# Patient Record
Sex: FEMALE | Race: BLACK OR AFRICAN AMERICAN | NOT HISPANIC OR LATINO | Employment: OTHER | URBAN - METROPOLITAN AREA
[De-identification: names, ages, dates, MRNs, and addresses within clinical notes are randomized per-mention and may not be internally consistent; named-entity substitution may affect disease eponyms.]

---

## 2023-04-19 ENCOUNTER — OFFICE VISIT (OUTPATIENT)
Dept: PRIMARY CARE | Facility: CLINIC | Age: 63
End: 2023-04-19
Payer: COMMERCIAL

## 2023-04-19 VITALS
RESPIRATION RATE: 12 BRPM | HEART RATE: 67 BPM | BODY MASS INDEX: 40.4 KG/M2 | DIASTOLIC BLOOD PRESSURE: 82 MMHG | HEIGHT: 63 IN | SYSTOLIC BLOOD PRESSURE: 138 MMHG | WEIGHT: 228 LBS

## 2023-04-19 DIAGNOSIS — I10 HYPERTENSION, UNSPECIFIED TYPE: ICD-10-CM

## 2023-04-19 DIAGNOSIS — Z86.79 HX OF ANEURYSM: ICD-10-CM

## 2023-04-19 DIAGNOSIS — R73.9 HYPERGLYCEMIA: ICD-10-CM

## 2023-04-19 DIAGNOSIS — E78.5 HYPERLIPIDEMIA, UNSPECIFIED HYPERLIPIDEMIA TYPE: Primary | ICD-10-CM

## 2023-04-19 DIAGNOSIS — E66.3 OVERWEIGHT: ICD-10-CM

## 2023-04-19 DIAGNOSIS — Z00.00 ENCOUNTER FOR ANNUAL PHYSICAL EXAM: ICD-10-CM

## 2023-04-19 DIAGNOSIS — E03.9 HYPOTHYROIDISM, UNSPECIFIED TYPE: ICD-10-CM

## 2023-04-19 DIAGNOSIS — E55.9 VITAMIN D DEFICIENCY: ICD-10-CM

## 2023-04-19 DIAGNOSIS — K21.9 GASTROESOPHAGEAL REFLUX DISEASE, UNSPECIFIED WHETHER ESOPHAGITIS PRESENT: ICD-10-CM

## 2023-04-19 PROBLEM — M62.838 MUSCLE SPASM: Status: ACTIVE | Noted: 2023-04-19

## 2023-04-19 PROBLEM — E87.6 HYPOKALEMIA: Status: ACTIVE | Noted: 2023-04-19

## 2023-04-19 PROBLEM — J20.9 ACUTE BRONCHITIS: Status: ACTIVE | Noted: 2023-04-19

## 2023-04-19 PROBLEM — R32 URINARY INCONTINENCE: Status: ACTIVE | Noted: 2023-04-19

## 2023-04-19 PROBLEM — G47.30 SLEEP APNEA: Status: ACTIVE | Noted: 2023-04-19

## 2023-04-19 PROBLEM — M79.671 RIGHT FOOT PAIN: Status: ACTIVE | Noted: 2023-04-19

## 2023-04-19 PROBLEM — R20.0 RIGHT ARM NUMBNESS: Status: ACTIVE | Noted: 2023-04-19

## 2023-04-19 PROBLEM — G47.00 INSOMNIA: Status: ACTIVE | Noted: 2023-04-19

## 2023-04-19 PROBLEM — R13.10 DIFFICULTY SWALLOWING: Status: ACTIVE | Noted: 2023-04-19

## 2023-04-19 PROBLEM — R06.83 SNORING: Status: ACTIVE | Noted: 2023-04-19

## 2023-04-19 LAB
POC APPEARANCE, URINE: CLEAR
POC BILIRUBIN, URINE: NEGATIVE
POC BLOOD, URINE: NEGATIVE
POC COLOR, URINE: YELLOW
POC FINGERSTICK BLOOD GLUCOSE: 101 MG/DL (ref 70–100)
POC GLUCOSE, URINE: NEGATIVE MG/DL
POC KETONES, URINE: NEGATIVE MG/DL
POC LEUKOCYTES, URINE: NEGATIVE
POC NITRITE,URINE: NEGATIVE
POC PH, URINE: 5 PH
POC PROTEIN, URINE: NEGATIVE MG/DL
POC SPECIFIC GRAVITY, URINE: 1.02
POC UROBILINOGEN, URINE: 0.2 EU/DL

## 2023-04-19 PROCEDURE — 3075F SYST BP GE 130 - 139MM HG: CPT | Performed by: INTERNAL MEDICINE

## 2023-04-19 PROCEDURE — 99396 PREV VISIT EST AGE 40-64: CPT | Performed by: INTERNAL MEDICINE

## 2023-04-19 PROCEDURE — 1036F TOBACCO NON-USER: CPT | Performed by: INTERNAL MEDICINE

## 2023-04-19 PROCEDURE — 3079F DIAST BP 80-89 MM HG: CPT | Performed by: INTERNAL MEDICINE

## 2023-04-19 PROCEDURE — 93000 ELECTROCARDIOGRAM COMPLETE: CPT | Performed by: INTERNAL MEDICINE

## 2023-04-19 PROCEDURE — 82962 GLUCOSE BLOOD TEST: CPT | Performed by: INTERNAL MEDICINE

## 2023-04-19 PROCEDURE — 99214 OFFICE O/P EST MOD 30 MIN: CPT | Performed by: INTERNAL MEDICINE

## 2023-04-19 PROCEDURE — 81002 URINALYSIS NONAUTO W/O SCOPE: CPT | Performed by: INTERNAL MEDICINE

## 2023-04-19 PROCEDURE — 81003 URINALYSIS AUTO W/O SCOPE: CPT | Performed by: INTERNAL MEDICINE

## 2023-04-19 RX ORDER — LEVOTHYROXINE SODIUM 75 UG/1
75 TABLET ORAL DAILY
COMMUNITY
Start: 2021-04-14 | End: 2023-08-25

## 2023-04-19 RX ORDER — ATORVASTATIN CALCIUM 40 MG/1
40 TABLET, FILM COATED ORAL DAILY
COMMUNITY
Start: 2019-01-29 | End: 2024-03-04 | Stop reason: SDUPTHER

## 2023-04-19 RX ORDER — MAGNESIUM HYDROXIDE 400 MG/5ML
1 SUSPENSION, ORAL (FINAL DOSE FORM) ORAL DAILY
COMMUNITY
Start: 2019-01-29

## 2023-04-19 RX ORDER — ALBUTEROL SULFATE 90 UG/1
2 AEROSOL, METERED RESPIRATORY (INHALATION) EVERY 6 HOURS PRN
COMMUNITY
Start: 2022-12-27 | End: 2023-04-19 | Stop reason: ALTCHOICE

## 2023-04-19 RX ORDER — INHALER, ASSIST DEVICES
SPACER (EA) MISCELLANEOUS
COMMUNITY
Start: 2022-12-27 | End: 2024-03-04 | Stop reason: ALTCHOICE

## 2023-04-19 RX ORDER — TIZANIDINE HYDROCHLORIDE 4 MG/1
4 CAPSULE, GELATIN COATED ORAL 2 TIMES DAILY
COMMUNITY
Start: 2022-07-29 | End: 2023-04-19 | Stop reason: ALTCHOICE

## 2023-04-19 RX ORDER — CETIRIZINE HYDROCHLORIDE 10 MG/1
10 TABLET ORAL DAILY
COMMUNITY
Start: 2022-12-27 | End: 2023-04-19 | Stop reason: ALTCHOICE

## 2023-04-19 RX ORDER — POTASSIUM CHLORIDE 750 MG/1
10 TABLET, EXTENDED RELEASE ORAL DAILY
COMMUNITY
End: 2023-11-20 | Stop reason: SDUPTHER

## 2023-04-19 RX ORDER — BENZONATATE 200 MG/1
200 CAPSULE ORAL EVERY 8 HOURS
COMMUNITY
Start: 2022-12-27 | End: 2023-04-19 | Stop reason: ALTCHOICE

## 2023-04-19 RX ORDER — TRIAMTERENE/HYDROCHLOROTHIAZID 37.5-25 MG
1 TABLET ORAL DAILY
COMMUNITY
End: 2024-03-25 | Stop reason: SDUPTHER

## 2023-04-19 RX ORDER — EMPAGLIFLOZIN 10 MG/1
TABLET, FILM COATED ORAL DAILY
COMMUNITY
End: 2023-11-20 | Stop reason: ALTCHOICE

## 2023-04-19 RX ORDER — OMEPRAZOLE 20 MG/1
20 CAPSULE, DELAYED RELEASE ORAL 2 TIMES DAILY
COMMUNITY

## 2023-04-19 RX ORDER — CHOLECALCIFEROL (VITAMIN D3) 50 MCG
50 TABLET ORAL DAILY
COMMUNITY
Start: 2021-08-10 | End: 2024-03-04 | Stop reason: SDUPTHER

## 2023-04-19 ASSESSMENT — PATIENT HEALTH QUESTIONNAIRE - PHQ9
SUM OF ALL RESPONSES TO PHQ9 QUESTIONS 1 AND 2: 0
1. LITTLE INTEREST OR PLEASURE IN DOING THINGS: NOT AT ALL
2. FEELING DOWN, DEPRESSED OR HOPELESS: NOT AT ALL

## 2023-04-19 ASSESSMENT — ENCOUNTER SYMPTOMS
LOSS OF SENSATION IN FEET: 0
DEPRESSION: 0
OCCASIONAL FEELINGS OF UNSTEADINESS: 0

## 2023-04-19 NOTE — ASSESSMENT & PLAN NOTE
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

## 2023-04-19 NOTE — PROGRESS NOTES
"Subjective :  Chief Complaint: Cici Bains is an 62 y.o. female here for an annual wellness visit and general medical care and f/u.     HPI:  HPI    Review of Systems  All systems reviewed and negative except for what was mentioned in the HPI    Objective   /82   Pulse 67   Resp 12   Ht 1.6 m (5' 3\")   Wt 103 kg (228 lb)   BMI 40.39 kg/m²     Physical Exam    Imaging:  No results found.     Labs reviewed:    Lab Results   Component Value Date    WBC 5.5 03/13/2020    HGB 12.2 03/13/2020    HCT 39.3 03/13/2020     03/13/2020    CHOL 152 03/13/2020    TRIG 54 03/13/2020    HDL 61.0 03/13/2020    ALT 20 03/13/2020    AST 22 03/13/2020     03/13/2020    K 3.2 (L) 03/13/2020     03/13/2020    CREATININE 0.86 03/13/2020    BUN 17 03/13/2020    CO2 29 03/13/2020    TSH 5.67 (H) 03/13/2020    INR 1.0 02/24/2020    HGBA1C 6.2 08/21/2019       Past Medical, Surgical, and Family History reviewed and updated in chart.    I have reviewed and reconciled the medication list with the patient today.   Current Outpatient Medications:     albuterol 90 mcg/actuation inhaler, Inhale 2 puffs every 6 hours if needed., Disp: , Rfl:     atorvastatin (Lipitor) 40 mg tablet, Take 1 tablet (40 mg) by mouth once daily., Disp: , Rfl:     benzonatate (Tessalon) 200 mg capsule, Take 1 capsule (200 mg) by mouth in the morning and 1 capsule (200 mg) at noon and 1 capsule (200 mg) before bedtime., Disp: , Rfl:     cetirizine (ZyrTEC) 10 mg tablet, Take 1 tablet (10 mg) by mouth once daily., Disp: , Rfl:     cholecalciferol (Vitamin D-3) 50 MCG (2000 UT) tablet, Take 1 tablet (50 mcg) by mouth once daily., Disp: , Rfl:     cyanocobalamin, vitamin B-12, 5,000 mcg tablet,disintegrating, Take 1 tablet by mouth once daily., Disp: , Rfl:     levothyroxine (Synthroid, Levoxyl) 75 mcg tablet, Take 1 tablet (75 mcg) by mouth once daily., Disp: , Rfl:     OptiChamber Suzanna VHC inhaler, use as directed, Disp: , Rfl:     " tiZANidine (Zanaflex) 4 mg capsule, Take 1 capsule (4 mg) by mouth in the morning and 1 capsule (4 mg) before bedtime., Disp: , Rfl:     Jardiance 10 mg, Take by mouth once daily., Disp: , Rfl:     Klor-Con M10 10 mEq ER tablet, Take 1 tablet (10 mEq) by mouth once daily., Disp: , Rfl:     omeprazole (PriLOSEC) 20 mg DR capsule, Take 1 capsule (20 mg) by mouth in the morning and 1 capsule (20 mg) before bedtime., Disp: , Rfl:     triamterene-hydrochlorothiazid (Maxzide-25) 37.5-25 mg tablet, Take 1 tablet by mouth once daily., Disp: , Rfl:      List of current healthcare providers:  Patient Care Team:  Yohan Cornelius MD as PCP - General     HRA:     Over the past 2 weeks, how often have you been bothered by any of the following problems?  Little interest or pleasure in doing things: Not at all  Feeling down, depressed, or hopeless: Not at all  Patient Health Questionnaire-2 Score: 0                         Assessment/Plan :  Problem List Items Addressed This Visit    None    The following health maintenance schedule was reviewed with the patient and provided in printed form in the after visit summary:  Health Maintenance   Topic Date Due    Yearly Adult Physical  Never done    Medicare Initial Physical (IPPE)  Never done    HIV Screening  Never done    MMR Vaccines (1 of 1 - Standard series) Never done    Hepatitis C Screening  Never done    Cervical Cancer Screening  Never done    Zoster Vaccines (1 of 2) Never done    DTaP/Tdap/Td Vaccines (3 - Td or Tdap) 04/01/2019    COVID-19 Vaccine (3 - Booster for Pfizer series) 06/03/2021    Pneumococcal Vaccine: 65+ Years (1 - PCV) 07/16/2023    Influenza Vaccine (Season Ended) 09/01/2023    Mammogram  02/17/2024    Lipid Panel  03/13/2025    Colorectal Cancer Screening  03/06/2030    Hepatitis B Vaccines  Completed    HIB Vaccines  Aged Out    IPV Vaccines  Aged Out    Hepatitis A Vaccines  Aged Out    Meningococcal Vaccine  Aged Out    Rotavirus Vaccines  Aged Out     HPV Vaccines  Aged Out    Pneumococcal Vaccine: Pediatrics (0 to 5 Years) and At-Risk Patients (6 to 64 Years)  Aged Out    Irritable Bowel Syndrome  Discontinued       Advance Care Planning   No living well           No orders of the defined types were placed in this encounter.      Continue current medications as listed  Follow up in 3 months we will check hemoglobin A1c CMP and a CBC, thyroid profile, lipid profile

## 2023-04-19 NOTE — ASSESSMENT & PLAN NOTE
Continue PPI  Patient is concerned about the GERD not getting better  Also has history of polyps in the stomach she would like to have an evaluation.

## 2023-04-19 NOTE — ASSESSMENT & PLAN NOTE
TSH is slightly elevated I will continue Synthroid the same dose and continue to monitor TSH T3-T4

## 2023-04-24 DIAGNOSIS — R73.9 HYPERGLYCEMIA: ICD-10-CM

## 2023-04-24 DIAGNOSIS — E03.9 HYPOTHYROID: ICD-10-CM

## 2023-04-24 DIAGNOSIS — I10 HTN (HYPERTENSION): ICD-10-CM

## 2023-04-24 DIAGNOSIS — E78.5 HLD (HYPERLIPIDEMIA): Primary | ICD-10-CM

## 2023-04-24 DIAGNOSIS — R93.5 ABNORMAL CT SCAN, PELVIS: ICD-10-CM

## 2023-07-19 ENCOUNTER — OFFICE VISIT (OUTPATIENT)
Dept: PRIMARY CARE | Facility: CLINIC | Age: 63
End: 2023-07-19
Payer: MEDICARE

## 2023-07-19 VITALS
OXYGEN SATURATION: 97 % | DIASTOLIC BLOOD PRESSURE: 72 MMHG | BODY MASS INDEX: 40.74 KG/M2 | WEIGHT: 230 LBS | SYSTOLIC BLOOD PRESSURE: 126 MMHG | RESPIRATION RATE: 12 BRPM | HEART RATE: 65 BPM

## 2023-07-19 DIAGNOSIS — E27.8 ADRENAL MASS (MULTI): ICD-10-CM

## 2023-07-19 DIAGNOSIS — I10 HYPERTENSION, UNSPECIFIED TYPE: ICD-10-CM

## 2023-07-19 DIAGNOSIS — R73.9 HYPERGLYCEMIA: ICD-10-CM

## 2023-07-19 DIAGNOSIS — I10 ESSENTIAL HYPERTENSION: Primary | Chronic | ICD-10-CM

## 2023-07-19 DIAGNOSIS — E78.5 HYPERLIPIDEMIA, UNSPECIFIED HYPERLIPIDEMIA TYPE: ICD-10-CM

## 2023-07-19 DIAGNOSIS — E03.9 HYPOTHYROIDISM, UNSPECIFIED TYPE: ICD-10-CM

## 2023-07-19 PROBLEM — M54.2 NECK PAIN: Status: ACTIVE | Noted: 2018-04-26

## 2023-07-19 PROBLEM — K21.9 GASTROESOPHAGEAL REFLUX DISEASE: Status: ACTIVE | Noted: 2017-01-13

## 2023-07-19 PROBLEM — M79.2 RADICULAR PAIN OF LEFT UPPER EXTREMITY: Status: ACTIVE | Noted: 2018-04-26

## 2023-07-19 LAB — POC FINGERSTICK BLOOD GLUCOSE: 114 MG/DL (ref 70–100)

## 2023-07-19 PROCEDURE — 99214 OFFICE O/P EST MOD 30 MIN: CPT | Performed by: INTERNAL MEDICINE

## 2023-07-19 PROCEDURE — 82962 GLUCOSE BLOOD TEST: CPT | Performed by: INTERNAL MEDICINE

## 2023-07-19 PROCEDURE — 3074F SYST BP LT 130 MM HG: CPT | Performed by: INTERNAL MEDICINE

## 2023-07-19 PROCEDURE — 1159F MED LIST DOCD IN RCRD: CPT | Performed by: INTERNAL MEDICINE

## 2023-07-19 PROCEDURE — 3078F DIAST BP <80 MM HG: CPT | Performed by: INTERNAL MEDICINE

## 2023-07-19 PROCEDURE — 1036F TOBACCO NON-USER: CPT | Performed by: INTERNAL MEDICINE

## 2023-07-19 RX ORDER — TRAZODONE HYDROCHLORIDE 50 MG/1
50 TABLET ORAL NIGHTLY PRN
COMMUNITY
Start: 2014-04-09

## 2023-07-19 NOTE — PROGRESS NOTES
Subjective   Chief complaint: Cici Bains is a 63 y.o. female who presents for Elbow Injury (Pt c/o right elbow pain with stiffness. Patient is new on medicare ).    HPI:  Pt is here for blood work follow up. She has been doing well today, but has mild concern about her right elbow pain. She states this pain began a week ago in her elbow and she believes it was exacerbate by cooking for her birthday.        Objective   /72   Pulse 65   Resp 12   Wt 104 kg (230 lb)   SpO2 97%   BMI 40.74 kg/m²   Physical Exam  Constitutional:       Appearance: Normal appearance. She is obese.   HENT:      Head: Normocephalic and atraumatic.      Right Ear: External ear normal.      Left Ear: External ear normal.      Nose: Nose normal.      Mouth/Throat:      Pharynx: Oropharynx is clear.   Eyes:      Extraocular Movements: Extraocular movements intact.      Conjunctiva/sclera: Conjunctivae normal.      Pupils: Pupils are equal, round, and reactive to light.   Cardiovascular:      Rate and Rhythm: Normal rate and regular rhythm.      Heart sounds: No murmur heard.  Pulmonary:      Effort: Pulmonary effort is normal. No respiratory distress.      Breath sounds: Normal breath sounds.   Abdominal:      General: Abdomen is flat. Bowel sounds are normal.      Palpations: Abdomen is soft.   Musculoskeletal:         General: Normal range of motion.      Cervical back: Normal range of motion. No rigidity or tenderness.   Skin:     General: Skin is warm and dry.   Neurological:      General: No focal deficit present.      Mental Status: She is alert. Mental status is at baseline.   Psychiatric:         Mood and Affect: Mood normal.         Behavior: Behavior normal.       I have reviewed and reconciled the medication list with the patient today.   Current Outpatient Medications:     atorvastatin (Lipitor) 40 mg tablet, Take 1 tablet (40 mg) by mouth once daily., Disp: , Rfl:     cholecalciferol (Vitamin D-3) 50 MCG (2000 UT)  tablet, Take 1 tablet (50 mcg) by mouth once daily., Disp: , Rfl:     cyanocobalamin, vitamin B-12, 5,000 mcg tablet,disintegrating, Take 1 tablet by mouth once daily., Disp: , Rfl:     Jardiance 10 mg, Take by mouth once daily., Disp: , Rfl:     Klor-Con M10 10 mEq ER tablet, Take 1 tablet (10 mEq) by mouth once daily., Disp: , Rfl:     levothyroxine (Synthroid, Levoxyl) 75 mcg tablet, Take 1 tablet (75 mcg) by mouth once daily., Disp: , Rfl:     omeprazole (PriLOSEC) 20 mg DR capsule, Take 1 capsule (20 mg) by mouth 2 times a day., Disp: , Rfl:     OptiChamber Suzanna VHC inhaler, use as directed, Disp: , Rfl:     traZODone (Desyrel) 50 mg tablet, Take 1 tablet (50 mg) by mouth as needed at bedtime for sleep., Disp: , Rfl:     triamterene-hydrochlorothiazid (Maxzide-25) 37.5-25 mg tablet, Take 1 tablet by mouth once daily., Disp: , Rfl:      Imaging:  No results found.     Labs reviewed:    Lab Results   Component Value Date    WBC 5.5 03/13/2020    HGB 12.2 03/13/2020    HCT 39.3 03/13/2020     03/13/2020    CHOL 152 03/13/2020    TRIG 54 03/13/2020    HDL 61.0 03/13/2020    ALT 20 03/13/2020    AST 22 03/13/2020     03/13/2020    K 3.2 (L) 03/13/2020     03/13/2020    CREATININE 0.86 03/13/2020    BUN 17 03/13/2020    CO2 29 03/13/2020    TSH 5.67 (H) 03/13/2020    INR 1.0 02/24/2020    HGBA1C 6.2 08/21/2019       Assessment/Plan   Problem List Items Addressed This Visit          Cardiac and Vasculature    Essential hypertension - Primary (Chronic)     Well controlled. Continue medication as prescribed.          HLD (hyperlipidemia)     Lipid panel WNL today.             Endocrine/Metabolic    Hyperglycemia    Relevant Orders    POCT fingerstick glucose manually resulted (Completed)    Hypothyroid     TSH 4.79 today.          Reviewed CT abd/pelvis with patient and discussed findings of nodule on the adrenal gland, likely an adrenal adenoma. Will repeat CT adrenals for further evaluation.    HbA1c remains stable at 6.3. Discussed healthy lifestyle. TSH mildly elevated, no concern today.  Continue current medications as listed.  Follow up in November for repeat HbA1c.

## 2023-08-25 DIAGNOSIS — E03.9 HYPOTHYROIDISM, UNSPECIFIED: ICD-10-CM

## 2023-08-25 RX ORDER — LEVOTHYROXINE SODIUM 75 UG/1
75 TABLET ORAL DAILY
Qty: 90 TABLET | Refills: 3 | Status: SHIPPED | OUTPATIENT
Start: 2023-08-25 | End: 2023-11-09 | Stop reason: SDUPTHER

## 2023-10-03 ENCOUNTER — TELEPHONE (OUTPATIENT)
Dept: PRIMARY CARE | Facility: CLINIC | Age: 63
End: 2023-10-03
Payer: MEDICARE

## 2023-10-03 DIAGNOSIS — I10 HTN (HYPERTENSION): Primary | ICD-10-CM

## 2023-10-03 DIAGNOSIS — R73.9 HYPERGLYCEMIA: ICD-10-CM

## 2023-10-13 ENCOUNTER — TELEPHONE (OUTPATIENT)
Dept: PRIMARY CARE | Facility: CLINIC | Age: 63
End: 2023-10-13
Payer: MEDICARE

## 2023-10-17 ENCOUNTER — LAB (OUTPATIENT)
Dept: LAB | Facility: LAB | Age: 63
End: 2023-10-17
Payer: MEDICARE

## 2023-10-17 DIAGNOSIS — E27.8 ADRENAL MASS (MULTI): ICD-10-CM

## 2023-10-17 LAB
CREAT SERPL-MCNC: 0.93 MG/DL (ref 0.5–1.05)
GFR SERPL CREATININE-BSD FRML MDRD: 68 ML/MIN/1.73M*2

## 2023-10-17 PROCEDURE — 36415 COLL VENOUS BLD VENIPUNCTURE: CPT

## 2023-10-17 PROCEDURE — 82565 ASSAY OF CREATININE: CPT

## 2023-10-18 ENCOUNTER — ANCILLARY PROCEDURE (OUTPATIENT)
Dept: RADIOLOGY | Facility: CLINIC | Age: 63
End: 2023-10-18
Payer: MEDICARE

## 2023-10-18 DIAGNOSIS — E03.9 HYPOTHYROIDISM, UNSPECIFIED TYPE: ICD-10-CM

## 2023-10-18 DIAGNOSIS — I10 ESSENTIAL HYPERTENSION: Chronic | ICD-10-CM

## 2023-10-18 PROCEDURE — 74150 CT ABDOMEN W/O CONTRAST: CPT | Performed by: RADIOLOGY

## 2023-10-18 PROCEDURE — 74150 CT ABDOMEN W/O CONTRAST: CPT

## 2023-10-30 PROBLEM — J20.9 ACUTE BRONCHITIS: Status: RESOLVED | Noted: 2023-04-19 | Resolved: 2023-10-30

## 2023-11-09 DIAGNOSIS — E03.9 HYPOTHYROIDISM, UNSPECIFIED: ICD-10-CM

## 2023-11-10 RX ORDER — LEVOTHYROXINE SODIUM 75 UG/1
75 TABLET ORAL DAILY
Qty: 90 TABLET | Refills: 3 | Status: SHIPPED | OUTPATIENT
Start: 2023-11-10 | End: 2024-03-25 | Stop reason: SDUPTHER

## 2023-11-13 ENCOUNTER — APPOINTMENT (OUTPATIENT)
Dept: PRIMARY CARE | Facility: CLINIC | Age: 63
End: 2023-11-13
Payer: MEDICARE

## 2023-11-20 ENCOUNTER — OFFICE VISIT (OUTPATIENT)
Dept: PRIMARY CARE | Facility: CLINIC | Age: 63
End: 2023-11-20
Payer: MEDICARE

## 2023-11-20 VITALS
BODY MASS INDEX: 40.92 KG/M2 | HEART RATE: 57 BPM | WEIGHT: 231 LBS | OXYGEN SATURATION: 95 % | SYSTOLIC BLOOD PRESSURE: 99 MMHG | RESPIRATION RATE: 12 BRPM | DIASTOLIC BLOOD PRESSURE: 63 MMHG

## 2023-11-20 DIAGNOSIS — E87.6 HYPOKALEMIA: ICD-10-CM

## 2023-11-20 DIAGNOSIS — I10 HYPERTENSION, UNSPECIFIED TYPE: Primary | ICD-10-CM

## 2023-11-20 DIAGNOSIS — R73.9 HYPERGLYCEMIA: ICD-10-CM

## 2023-11-20 DIAGNOSIS — Z12.31 ENCOUNTER FOR SCREENING MAMMOGRAM FOR BREAST CANCER: ICD-10-CM

## 2023-11-20 DIAGNOSIS — D35.02 ADENOMA OF LEFT ADRENAL GLAND: ICD-10-CM

## 2023-11-20 PROCEDURE — 3078F DIAST BP <80 MM HG: CPT | Performed by: INTERNAL MEDICINE

## 2023-11-20 PROCEDURE — 3074F SYST BP LT 130 MM HG: CPT | Performed by: INTERNAL MEDICINE

## 2023-11-20 PROCEDURE — 1159F MED LIST DOCD IN RCRD: CPT | Performed by: INTERNAL MEDICINE

## 2023-11-20 PROCEDURE — 1036F TOBACCO NON-USER: CPT | Performed by: INTERNAL MEDICINE

## 2023-11-20 PROCEDURE — 99214 OFFICE O/P EST MOD 30 MIN: CPT | Performed by: INTERNAL MEDICINE

## 2023-11-20 RX ORDER — POTASSIUM CHLORIDE 750 MG/1
10 TABLET, FILM COATED, EXTENDED RELEASE ORAL DAILY
Qty: 90 TABLET | Refills: 3 | Status: SHIPPED | OUTPATIENT
Start: 2023-11-20

## 2023-11-20 NOTE — PROGRESS NOTES
Subjective   Chief complaint: Cici Bains is a 63 y.o. female who presents for Follow-up (Pt is here for blood work follow up ).    HPI:  63 year old female presents today for blood work follow up. Vitals WNL. Hypotensive on exam today. Pt takes Maxzide for HTN but denies syncope, light headedness, vision changes. Blood work reviewed showing A1C of 6.3, CMP WNL. Pt also has a CT to review. CT reviewed showing left adrenal adenoma but otherwise WNL. No other health concerns or questions today.        Objective   BP 99/63   Pulse 57   Resp 12   Wt 105 kg (231 lb)   SpO2 95%   BMI 40.92 kg/m²   Physical Exam  Constitutional:       Appearance: Normal appearance. She is normal weight.   HENT:      Head: Normocephalic and atraumatic.   Eyes:      Extraocular Movements: Extraocular movements intact.      Conjunctiva/sclera: Conjunctivae normal.   Neurological:      General: No focal deficit present.      Mental Status: She is alert and oriented to person, place, and time. Mental status is at baseline.         I have reviewed and reconciled the medication list with the patient today.   Current Outpatient Medications:     atorvastatin (Lipitor) 40 mg tablet, Take 1 tablet (40 mg) by mouth once daily., Disp: , Rfl:     cholecalciferol (Vitamin D-3) 50 MCG (2000 UT) tablet, Take 1 tablet (50 mcg) by mouth once daily., Disp: , Rfl:     Jardiance 10 mg, Take by mouth once daily., Disp: , Rfl:     Klor-Con M10 10 mEq ER tablet, Take 1 tablet (10 mEq) by mouth once daily., Disp: , Rfl:     levothyroxine (Synthroid, Levoxyl) 75 mcg tablet, Take 1 tablet (75 mcg) by mouth once daily., Disp: 90 tablet, Rfl: 3    omeprazole (PriLOSEC) 20 mg DR capsule, Take 1 capsule (20 mg) by mouth 2 times a day., Disp: , Rfl:     OptiChamber Suzanna VHC inhaler, use as directed, Disp: , Rfl:     traZODone (Desyrel) 50 mg tablet, Take 1 tablet (50 mg) by mouth as needed at bedtime for sleep., Disp: , Rfl:     triamterene-hydrochlorothiazid  (Maxzide-25) 37.5-25 mg tablet, Take 1 tablet by mouth once daily., Disp: , Rfl:     cyanocobalamin, vitamin B-12, 5,000 mcg tablet,disintegrating, Take 1 tablet by mouth once daily., Disp: , Rfl:      Imaging:  No results found.     Labs reviewed:    Lab Results   Component Value Date    WBC 5.5 03/13/2020    HGB 12.2 03/13/2020    HCT 39.3 03/13/2020     03/13/2020    CHOL 152 03/13/2020    TRIG 54 03/13/2020    HDL 61.0 03/13/2020    ALT 20 03/13/2020    AST 22 03/13/2020     03/13/2020    K 3.2 (L) 03/13/2020     03/13/2020    CREATININE 0.93 10/17/2023    BUN 17 03/13/2020    CO2 29 03/13/2020    TSH 5.67 (H) 03/13/2020    INR 1.0 02/24/2020    HGBA1C 6.2 08/21/2019       Assessment/Plan   Problem List Items Addressed This Visit       HTN (hypertension) - Primary     Hypotensive on exam today. Start taking half of current Maxzide tablet         Hyperglycemia     A1C 6.3. Increase Jardiance to 25mg         Hypokalemia     Potassium normal on CMP         Adenoma of left adrenal gland     Pt instructed that adenoma is benign             Continue current medications as listed  Follow up in 3 months for annual

## 2024-02-19 ENCOUNTER — HOSPITAL ENCOUNTER (OUTPATIENT)
Dept: RADIOLOGY | Facility: CLINIC | Age: 64
Discharge: HOME | End: 2024-02-19
Payer: MEDICARE

## 2024-02-19 DIAGNOSIS — Z12.31 ENCOUNTER FOR SCREENING MAMMOGRAM FOR BREAST CANCER: ICD-10-CM

## 2024-02-19 PROCEDURE — 77067 SCR MAMMO BI INCL CAD: CPT | Performed by: RADIOLOGY

## 2024-02-19 PROCEDURE — 77067 SCR MAMMO BI INCL CAD: CPT

## 2024-02-19 PROCEDURE — 77063 BREAST TOMOSYNTHESIS BI: CPT | Performed by: RADIOLOGY

## 2024-02-26 ENCOUNTER — CLINICAL SUPPORT (OUTPATIENT)
Dept: PRIMARY CARE | Facility: CLINIC | Age: 64
End: 2024-02-26
Payer: MEDICARE

## 2024-02-26 DIAGNOSIS — Z00.00 ENCOUNTER FOR ANNUAL WELLNESS VISIT (AWV) IN MEDICARE PATIENT: ICD-10-CM

## 2024-02-26 DIAGNOSIS — E87.6 HYPOKALEMIA: ICD-10-CM

## 2024-02-26 DIAGNOSIS — R73.9 HYPERGLYCEMIA: ICD-10-CM

## 2024-02-26 DIAGNOSIS — E03.9 HYPOTHYROIDISM, UNSPECIFIED TYPE: ICD-10-CM

## 2024-02-26 DIAGNOSIS — E55.9 VITAMIN D DEFICIENCY: ICD-10-CM

## 2024-02-26 DIAGNOSIS — I10 PRIMARY HYPERTENSION: ICD-10-CM

## 2024-02-26 DIAGNOSIS — E78.5 HYPERLIPIDEMIA, UNSPECIFIED HYPERLIPIDEMIA TYPE: ICD-10-CM

## 2024-02-26 DIAGNOSIS — D50.9 IRON DEFICIENCY ANEMIA, UNSPECIFIED IRON DEFICIENCY ANEMIA TYPE: ICD-10-CM

## 2024-02-26 DIAGNOSIS — I10 BENIGN ESSENTIAL HYPERTENSION: ICD-10-CM

## 2024-02-26 DIAGNOSIS — E78.00 ELEVATED LDL CHOLESTEROL LEVEL: ICD-10-CM

## 2024-02-26 PROBLEM — E27.8 ADRENAL MASS (MULTI): Status: ACTIVE | Noted: 2023-10-17

## 2024-02-26 PROBLEM — T14.8XXA INJURY OF MUSCULOSKELETAL SYSTEM: Status: ACTIVE | Noted: 2024-02-26

## 2024-02-26 LAB
25(OH)D3 SERPL-MCNC: 29 NG/ML (ref 30–100)
ALBUMIN SERPL BCP-MCNC: 3.9 G/DL (ref 3.4–5)
ALP SERPL-CCNC: 55 U/L (ref 33–136)
ALT SERPL W P-5'-P-CCNC: 25 U/L (ref 7–45)
ANION GAP SERPL CALC-SCNC: 13 MMOL/L (ref 10–20)
AST SERPL W P-5'-P-CCNC: 20 U/L (ref 9–39)
BILIRUB SERPL-MCNC: 0.6 MG/DL (ref 0–1.2)
BUN SERPL-MCNC: 15 MG/DL (ref 6–23)
CALCIUM SERPL-MCNC: 9.7 MG/DL (ref 8.6–10.6)
CHLORIDE SERPL-SCNC: 104 MMOL/L (ref 98–107)
CHOLEST SERPL-MCNC: 209 MG/DL (ref 0–199)
CHOLESTEROL/HDL RATIO: 3.7
CO2 SERPL-SCNC: 28 MMOL/L (ref 21–32)
CREAT SERPL-MCNC: 0.82 MG/DL (ref 0.5–1.05)
EGFRCR SERPLBLD CKD-EPI 2021: 79 ML/MIN/1.73M*2
ERYTHROCYTE [DISTWIDTH] IN BLOOD BY AUTOMATED COUNT: 13.6 % (ref 11.5–14.5)
EST. AVERAGE GLUCOSE BLD GHB EST-MCNC: 140 MG/DL
GLUCOSE SERPL-MCNC: 113 MG/DL (ref 74–99)
HBA1C MFR BLD: 6.5 %
HCT VFR BLD AUTO: 42.8 % (ref 36–46)
HDLC SERPL-MCNC: 56.3 MG/DL
HGB BLD-MCNC: 12.6 G/DL (ref 12–16)
LDLC SERPL CALC-MCNC: 135 MG/DL
MCH RBC QN AUTO: 29 PG (ref 26–34)
MCHC RBC AUTO-ENTMCNC: 29.4 G/DL (ref 32–36)
MCV RBC AUTO: 98 FL (ref 80–100)
NON HDL CHOLESTEROL: 153 MG/DL (ref 0–149)
NRBC BLD-RTO: 0 /100 WBCS (ref 0–0)
PLATELET # BLD AUTO: 199 X10*3/UL (ref 150–450)
POTASSIUM SERPL-SCNC: 3.8 MMOL/L (ref 3.5–5.3)
PROT SERPL-MCNC: 6.5 G/DL (ref 6.4–8.2)
RBC # BLD AUTO: 4.35 X10*6/UL (ref 4–5.2)
SODIUM SERPL-SCNC: 141 MMOL/L (ref 136–145)
T3FREE SERPL-MCNC: 3.2 PG/ML (ref 2.3–4.2)
T4 FREE SERPL-MCNC: 1.39 NG/DL (ref 0.78–1.48)
TRIGL SERPL-MCNC: 91 MG/DL (ref 0–149)
TSH SERPL-ACNC: 2.15 MIU/L (ref 0.44–3.98)
VLDL: 18 MG/DL (ref 0–40)
WBC # BLD AUTO: 5.2 X10*3/UL (ref 4.4–11.3)

## 2024-02-26 PROCEDURE — 85027 COMPLETE CBC AUTOMATED: CPT

## 2024-02-26 PROCEDURE — 84443 ASSAY THYROID STIM HORMONE: CPT

## 2024-02-26 PROCEDURE — 84439 ASSAY OF FREE THYROXINE: CPT

## 2024-02-26 PROCEDURE — 80053 COMPREHEN METABOLIC PANEL: CPT

## 2024-02-26 PROCEDURE — 84481 FREE ASSAY (FT-3): CPT

## 2024-02-26 PROCEDURE — 36415 COLL VENOUS BLD VENIPUNCTURE: CPT

## 2024-02-26 PROCEDURE — 83036 HEMOGLOBIN GLYCOSYLATED A1C: CPT

## 2024-02-26 PROCEDURE — 80061 LIPID PANEL: CPT

## 2024-02-26 PROCEDURE — 82306 VITAMIN D 25 HYDROXY: CPT

## 2024-03-01 ENCOUNTER — APPOINTMENT (OUTPATIENT)
Dept: PRIMARY CARE | Facility: CLINIC | Age: 64
End: 2024-03-01
Payer: MEDICARE

## 2024-03-04 ENCOUNTER — OFFICE VISIT (OUTPATIENT)
Dept: PRIMARY CARE | Facility: CLINIC | Age: 64
End: 2024-03-04
Payer: MEDICARE

## 2024-03-04 VITALS
SYSTOLIC BLOOD PRESSURE: 139 MMHG | HEART RATE: 80 BPM | BODY MASS INDEX: 40.74 KG/M2 | WEIGHT: 230 LBS | OXYGEN SATURATION: 96 % | DIASTOLIC BLOOD PRESSURE: 81 MMHG | RESPIRATION RATE: 14 BRPM

## 2024-03-04 DIAGNOSIS — Z00.00 ENCOUNTER FOR ANNUAL PHYSICAL EXAM: Primary | ICD-10-CM

## 2024-03-04 DIAGNOSIS — M81.0 OSTEOPOROSIS: ICD-10-CM

## 2024-03-04 DIAGNOSIS — R06.83 SNORING: ICD-10-CM

## 2024-03-04 DIAGNOSIS — E11.9 TYPE 2 DIABETES MELLITUS WITHOUT COMPLICATION, WITHOUT LONG-TERM CURRENT USE OF INSULIN (MULTI): ICD-10-CM

## 2024-03-04 DIAGNOSIS — Z00.00 ANNUAL PHYSICAL EXAM: ICD-10-CM

## 2024-03-04 DIAGNOSIS — E55.9 VITAMIN D DEFICIENCY: ICD-10-CM

## 2024-03-04 DIAGNOSIS — E78.5 HYPERLIPIDEMIA, UNSPECIFIED HYPERLIPIDEMIA TYPE: ICD-10-CM

## 2024-03-04 DIAGNOSIS — M19.90 OSTEOARTHRITIS, UNSPECIFIED OSTEOARTHRITIS TYPE, UNSPECIFIED SITE: ICD-10-CM

## 2024-03-04 DIAGNOSIS — R73.9 HYPERGLYCEMIA: ICD-10-CM

## 2024-03-04 LAB
POC APPEARANCE, URINE: CLEAR
POC BILIRUBIN, URINE: NEGATIVE
POC BLOOD, URINE: NEGATIVE
POC COLOR, URINE: YELLOW
POC GLUCOSE, URINE: NEGATIVE MG/DL
POC KETONES, URINE: NEGATIVE MG/DL
POC LEUKOCYTES, URINE: NEGATIVE
POC NITRITE,URINE: NEGATIVE
POC OCCULT BLOOD STOOL #1: NEGATIVE
POC OCCULT BLOOD STOOL #2: NEGATIVE
POC OCCULT BLOOD STOOL #3: NEGATIVE
POC PH, URINE: 7.5 PH
POC PROTEIN, URINE: NEGATIVE MG/DL
POC SPECIFIC GRAVITY, URINE: 1.01
POC UROBILINOGEN, URINE: 1 EU/DL

## 2024-03-04 PROCEDURE — 99214 OFFICE O/P EST MOD 30 MIN: CPT | Performed by: INTERNAL MEDICINE

## 2024-03-04 PROCEDURE — 3050F LDL-C >= 130 MG/DL: CPT | Performed by: INTERNAL MEDICINE

## 2024-03-04 PROCEDURE — 1036F TOBACCO NON-USER: CPT | Performed by: INTERNAL MEDICINE

## 2024-03-04 PROCEDURE — 1170F FXNL STATUS ASSESSED: CPT | Performed by: INTERNAL MEDICINE

## 2024-03-04 PROCEDURE — 3079F DIAST BP 80-89 MM HG: CPT | Performed by: INTERNAL MEDICINE

## 2024-03-04 PROCEDURE — 1126F AMNT PAIN NOTED NONE PRSNT: CPT | Performed by: INTERNAL MEDICINE

## 2024-03-04 PROCEDURE — 93000 ELECTROCARDIOGRAM COMPLETE: CPT | Performed by: INTERNAL MEDICINE

## 2024-03-04 PROCEDURE — 3075F SYST BP GE 130 - 139MM HG: CPT | Performed by: INTERNAL MEDICINE

## 2024-03-04 PROCEDURE — 3044F HG A1C LEVEL LT 7.0%: CPT | Performed by: INTERNAL MEDICINE

## 2024-03-04 PROCEDURE — 82270 OCCULT BLOOD FECES: CPT | Performed by: INTERNAL MEDICINE

## 2024-03-04 PROCEDURE — 1159F MED LIST DOCD IN RCRD: CPT | Performed by: INTERNAL MEDICINE

## 2024-03-04 PROCEDURE — 81002 URINALYSIS NONAUTO W/O SCOPE: CPT | Performed by: INTERNAL MEDICINE

## 2024-03-04 RX ORDER — ATORVASTATIN CALCIUM 40 MG/1
40 TABLET, FILM COATED ORAL DAILY
Qty: 90 TABLET | Refills: 3 | Status: SHIPPED | OUTPATIENT
Start: 2024-03-04 | End: 2024-03-25 | Stop reason: SDUPTHER

## 2024-03-04 RX ORDER — CHOLECALCIFEROL (VITAMIN D3) 50 MCG
50 TABLET ORAL DAILY
Qty: 90 TABLET | Refills: 3 | Status: SHIPPED | OUTPATIENT
Start: 2024-03-04

## 2024-03-04 ASSESSMENT — COLUMBIA-SUICIDE SEVERITY RATING SCALE - C-SSRS
1. IN THE PAST MONTH, HAVE YOU WISHED YOU WERE DEAD OR WISHED YOU COULD GO TO SLEEP AND NOT WAKE UP?: NO
2. HAVE YOU ACTUALLY HAD ANY THOUGHTS OF KILLING YOURSELF?: NO
6. HAVE YOU EVER DONE ANYTHING, STARTED TO DO ANYTHING, OR PREPARED TO DO ANYTHING TO END YOUR LIFE?: NO

## 2024-03-04 ASSESSMENT — ACTIVITIES OF DAILY LIVING (ADL)
GROOMING: INDEPENDENT
DRESSING YOURSELF: INDEPENDENT
TOILETING: INDEPENDENT
HEARING - RIGHT EAR: FUNCTIONAL
HEARING - LEFT EAR: FUNCTIONAL
ADEQUATE_TO_COMPLETE_ADL: YES
JUDGMENT_ADEQUATE_SAFELY_COMPLETE_DAILY_ACTIVITIES: YES
WALKS IN HOME: INDEPENDENT
BATHING: INDEPENDENT
FEEDING YOURSELF: INDEPENDENT
PATIENT'S MEMORY ADEQUATE TO SAFELY COMPLETE DAILY ACTIVITIES?: YES

## 2024-03-04 ASSESSMENT — SOCIAL DETERMINANTS OF HEALTH (SDOH)

## 2024-03-04 ASSESSMENT — ENCOUNTER SYMPTOMS
DEPRESSION: 0
LOSS OF SENSATION IN FEET: 0
OCCASIONAL FEELINGS OF UNSTEADINESS: 0

## 2024-03-04 ASSESSMENT — ANXIETY QUESTIONNAIRES
7. FEELING AFRAID AS IF SOMETHING AWFUL MIGHT HAPPEN: NOT AT ALL
IF YOU CHECKED OFF ANY PROBLEMS ON THIS QUESTIONNAIRE, HOW DIFFICULT HAVE THESE PROBLEMS MADE IT FOR YOU TO DO YOUR WORK, TAKE CARE OF THINGS AT HOME, OR GET ALONG WITH OTHER PEOPLE: NOT DIFFICULT AT ALL
3. WORRYING TOO MUCH ABOUT DIFFERENT THINGS: NOT AT ALL
5. BEING SO RESTLESS THAT IT IS HARD TO SIT STILL: NOT AT ALL
1. FEELING NERVOUS, ANXIOUS, OR ON EDGE: NOT AT ALL
GAD7 TOTAL SCORE: 0
2. NOT BEING ABLE TO STOP OR CONTROL WORRYING: NOT AT ALL
6. BECOMING EASILY ANNOYED OR IRRITABLE: NOT AT ALL
4. TROUBLE RELAXING: NOT AT ALL

## 2024-03-04 ASSESSMENT — LIFESTYLE VARIABLES
HOW MANY STANDARD DRINKS CONTAINING ALCOHOL DO YOU HAVE ON A TYPICAL DAY: PATIENT DOES NOT DRINK
SKIP TO QUESTIONS 9-10: 1
HOW OFTEN DO YOU HAVE A DRINK CONTAINING ALCOHOL: NEVER
HOW OFTEN DO YOU HAVE SIX OR MORE DRINKS ON ONE OCCASION: NEVER
AUDIT-C TOTAL SCORE: 0

## 2024-03-04 ASSESSMENT — PATIENT HEALTH QUESTIONNAIRE - PHQ9
2. FEELING DOWN, DEPRESSED OR HOPELESS: NOT AT ALL
1. LITTLE INTEREST OR PLEASURE IN DOING THINGS: NOT AT ALL
SUM OF ALL RESPONSES TO PHQ9 QUESTIONS 1 & 2: 0

## 2024-03-04 ASSESSMENT — PAIN SCALES - GENERAL: PAINLEVEL: 0-NO PAIN

## 2024-03-04 NOTE — ASSESSMENT & PLAN NOTE
Hemoglobin A1C today 6.5. Discussed lifestyle modifications including diet and exercise. Will prescribe Jardiance 25 mg every day. Recheck A1C in 3 months.

## 2024-03-04 NOTE — PROGRESS NOTES
ANNUAL WELLNESS VISIT    Subjective :  Chief Complaint: Cici Bains is an 63 y.o. female here for an annual wellness visit and general medical care and f/u.     HPI:  HPI  She is complaining of difficulty falling asleep, difficulty staying asleep, with snoring and restlessness. She has not tried melatonin. Trazodone does not appear to resolve her issues.     Objective   /81   Pulse 80   Resp 14   Wt 104 kg (230 lb)   SpO2 96%   BMI 40.74 kg/m²     Physical Exam  Constitutional:       General: She is not in acute distress.     Appearance: Normal appearance. She is obese. She is not diaphoretic.   HENT:      Head: Normocephalic and atraumatic.      Mouth/Throat:      Mouth: Mucous membranes are dry.      Pharynx: Oropharynx is clear.   Eyes:      Extraocular Movements: Extraocular movements intact.      Conjunctiva/sclera: Conjunctivae normal.      Pupils: Pupils are equal, round, and reactive to light.   Cardiovascular:      Rate and Rhythm: Normal rate and regular rhythm.      Pulses: Normal pulses.      Heart sounds: Normal heart sounds. No murmur heard.  Pulmonary:      Effort: Pulmonary effort is normal. No respiratory distress.      Breath sounds: Normal breath sounds. No wheezing, rhonchi or rales.   Abdominal:      General: Bowel sounds are normal.      Palpations: Abdomen is soft.      Tenderness: There is no abdominal tenderness.   Musculoskeletal:         General: Normal range of motion.      Cervical back: Normal range of motion and neck supple.      Right lower leg: No edema.      Left lower leg: No edema.   Lymphadenopathy:      Cervical: No cervical adenopathy.   Skin:     General: Skin is warm and dry.      Capillary Refill: Capillary refill takes less than 2 seconds.   Neurological:      General: No focal deficit present.      Mental Status: She is alert and oriented to person, place, and time.      Sensory: No sensory deficit.         Imaging:  BI mammo bilateral screening  tomosynthesis    Result Date: 2/19/2024  Interpreted By:  Syeda Pratt, STUDY: BI MAMMO BILATERAL SCREENING TOMOSYNTHESIS;  2/19/2024 11:36 am   ACCESSION NUMBER(S): [id number]   ORDERING CLINICIAN: ELMA BLANK   INDICATION: Screening.  History of bilateral breast reduction.   COMPARISON: 02/17/2023 02/16/2022   FINDINGS: 2D and tomosynthesis images were reviewed at 1 mm slice thickness.   Density:  The breast tissue is almost entirely fatty.   Post breast reduction changes are noted bilaterally. No suspicious masses or calcifications are identified.       No mammographic evidence of malignancy.     BI-RADS CATEGORY: BI-RADS Category:  2 Benign. Recommendation:  Annual Screening. Recommended Date:  1 Year. Laterality:  Bilateral.   For any future breast imaging appointments, please call [phone]).     MACRO: None   Signed by: Syeda Pratt 2/19/2024 3:38 PM Dictation workstation:   [id number]       Labs reviewed:    Lab Results   Component Value Date    WBC 5.2 02/26/2024    HGB 12.6 02/26/2024    HCT 42.8 02/26/2024     02/26/2024    CHOL 209 (H) 02/26/2024    TRIG 91 02/26/2024    HDL 56.3 02/26/2024    ALT 25 02/26/2024    AST 20 02/26/2024     02/26/2024    K 3.8 02/26/2024     02/26/2024    CREATININE 0.82 02/26/2024    BUN 15 02/26/2024    CO2 28 02/26/2024    TSH 2.15 02/26/2024    INR 1.0 02/24/2020    HGBA1C 6.5 (H) 02/26/2024       Past Medical, Surgical, and Family History reviewed and updated in chart.    I have reviewed and reconciled the medication list with the patient today.   Current Outpatient Medications:     atorvastatin (Lipitor) 40 mg tablet, Take 1 tablet (40 mg) by mouth once daily., Disp: , Rfl:     cholecalciferol (Vitamin D-3) 50 MCG (2000 UT) tablet, Take 1 tablet (50 mcg) by mouth once daily., Disp: , Rfl:     cyanocobalamin, vitamin B-12, 5,000 mcg tablet,disintegrating, Take 1 tablet by mouth once daily., Disp: , Rfl:     empagliflozin (Jardiance) 25  mg, Take 1 tablet (25 mg) by mouth once daily., Disp: 60 tablet, Rfl: 2    levothyroxine (Synthroid, Levoxyl) 75 mcg tablet, Take 1 tablet (75 mcg) by mouth once daily., Disp: 90 tablet, Rfl: 3    omeprazole (PriLOSEC) 20 mg DR capsule, Take 1 capsule (20 mg) by mouth 2 times a day., Disp: , Rfl:     potassium chloride CR (Klor-Con M10) 10 mEq ER tablet, Take 1 tablet (10 mEq) by mouth once daily., Disp: 90 tablet, Rfl: 3    traZODone (Desyrel) 50 mg tablet, Take 1 tablet (50 mg) by mouth as needed at bedtime for sleep., Disp: , Rfl:     triamterene-hydrochlorothiazid (Maxzide-25) 37.5-25 mg tablet, Take 1 tablet by mouth once daily., Disp: , Rfl:      List of current healthcare providers:  Patient Care Team:  Yohan Cornelius MD as PCP - General     HRA:  Over the past 2 weeks, how often have you been bothered by any of the following problems?  Little interest or pleasure in doing things: Not at all  Feeling down, depressed, or hopeless: Not at all  Patient Health Questionnaire-2 Score: 0    Steadi Fall Risk  One or more falls in the last year? No  How many Times?    Was the patient injured in the fall?    Has trouble stepping onto curb? No  Advised to use a cane or walker to get around safely? No  Often has to rush to toilet? No  Feels unsteady when walking? No  Has lost some feeling in feet? No  Often feels sad or depressed? No  Steadies self on furniture while walking at home? No  Takes medicine that makes them feel lightheaded or more tired than usual? No  Worried about Falling? No  Takes medicine to sleep or improve mood? No  Needs to push with hands when rising from a chair? No                                ADL Screening  Hearing - Right Ear: Functional  Hearing - Left Ear: Functional  Bathing: Independent  Dressing: Independent  Walks in Home: Independent         Assessment/Plan :  Problem List Items Addressed This Visit          Cardiac and Vasculature    Hyperlipidemia     Atorvastatin increase from 40 mg  to 80 mg. Will recheck labs in 3 months.             Endocrine/Metabolic    Hyperglycemia    Vitamin D deficiency     Patient forgot take vitamin D daily. Will continue with 2000 U vitamin D.          Type 2 diabetes mellitus (CMS/[facility])     Hemoglobin A1C today 6.5. Discussed lifestyle modifications including diet and exercise. Will prescribe Jardiance 25 mg every day. Recheck A1C in 3 months.             Health Encounters    Encounter for annual physical exam - Primary       Musculoskeletal and Injuries    Osteoarthritis     Bone density scan ordered.             Sleep    Snoring     Had sleep study done April 2019, but unable to find results. Will order sleep study to evaluate for JENNIFER.           Other Visit Diagnoses       Annual physical exam        Relevant Orders    ECG 12 lead (Clinic Performed)    POCT UA (nonautomated) manually resulted (Completed)    POCT occult blood stool manually resulted (Completed)          The following health maintenance schedule was reviewed with the patient and provided in printed form in the after visit summary:  Health Maintenance   Topic Date Due    Influenza Vaccine (1) 06/30/2024 (Originally 9/1/2023)    Mammogram  02/19/2025    TSH Level  02/26/2025    Diabetes Screening  02/26/2025    Lipid Panel  02/26/2029    Colorectal Cancer Screening  03/06/2030    Medicare Initial Physical (IPPE)  Completed    Hepatitis B Vaccines  Completed    HIB Vaccines  Aged Out    IPV Vaccines  Aged Out    Hepatitis A Vaccines  Aged Out    Meningococcal Vaccine  Aged Out    Rotavirus Vaccines  Aged Out    HPV Vaccines  Aged Out    DTaP/Tdap/Td Vaccines  Discontinued    MMR Vaccines  Discontinued    Pneumococcal Vaccine: 65+ Years  Discontinued    Cervical Cancer Screening  Discontinued    Zoster Vaccines  Discontinued    Hepatitis C Screening  Discontinued    Bone Density Scan  Discontinued    COVID-19 Vaccine  Discontinued    Irritable Bowel Syndrome  Discontinued       Advance Care Planning    Was not discussed at this visit.              Orders Placed This Encounter   Procedures    POCT UA (nonautomated) manually resulted     Order Specific Question:   Release result to [employer]     Answer:   Immediate [1]    POCT occult blood stool manually resulted     Order Specific Question:   Release result to [employer]     Answer:   Immediate [1]    ECG 12 lead (Clinic Performed)       Continue current medications as listed. Begin Jardiance. Atorvastatin 80 mg was increased. Vitamin D 2000 U.   Follow up in 3 months.     Colonoscopy is due 2025. Mammogram is UTD, completed 02/2024.

## 2024-03-04 NOTE — ASSESSMENT & PLAN NOTE
Had sleep study done April 2019, but unable to find results. Will order sleep study to evaluate for JENNIFER.

## 2024-03-25 ENCOUNTER — CLINICAL SUPPORT (OUTPATIENT)
Dept: SLEEP MEDICINE | Facility: CLINIC | Age: 64
End: 2024-03-25
Payer: MEDICARE

## 2024-03-25 DIAGNOSIS — G47.30 SLEEP APNEA, UNSPECIFIED: ICD-10-CM

## 2024-03-25 DIAGNOSIS — I10 HYPERTENSION, UNSPECIFIED TYPE: ICD-10-CM

## 2024-03-25 DIAGNOSIS — E03.9 HYPOTHYROIDISM, UNSPECIFIED: ICD-10-CM

## 2024-03-25 DIAGNOSIS — R06.83 SNORING: ICD-10-CM

## 2024-03-25 DIAGNOSIS — E78.5 HYPERLIPIDEMIA, UNSPECIFIED HYPERLIPIDEMIA TYPE: ICD-10-CM

## 2024-03-25 PROCEDURE — 95806 SLEEP STUDY UNATT&RESP EFFT: CPT | Performed by: INTERNAL MEDICINE

## 2024-03-25 RX ORDER — TRIAMTERENE/HYDROCHLOROTHIAZID 37.5-25 MG
1 TABLET ORAL DAILY
Qty: 90 TABLET | Refills: 3 | Status: SHIPPED | OUTPATIENT
Start: 2024-03-25

## 2024-03-25 RX ORDER — LEVOTHYROXINE SODIUM 75 UG/1
75 TABLET ORAL DAILY
Qty: 90 TABLET | Refills: 3 | Status: SHIPPED | OUTPATIENT
Start: 2024-03-25

## 2024-03-25 RX ORDER — ATORVASTATIN CALCIUM 40 MG/1
40 TABLET, FILM COATED ORAL DAILY
Qty: 90 TABLET | Refills: 3 | Status: SHIPPED | OUTPATIENT
Start: 2024-03-25

## 2024-03-25 NOTE — PROGRESS NOTES
Type of Study: HOME SLEEP STUDY - NOMAD     The patient received equipment and instructions for use of the [employer] [facility] Nomad HSAT device. The patient was instructed how to apply the effort belts, cannula, thermistor. It was also explained how the Nomad and oximeter components work.  The patient was asked to record their sleep for at least a 6-hour period.     The patient was informed of their responsibility for the device and acknowledged this by signing the HSAT device contract. The patient was asked to return the device on 3/26/24 between the hours of 8am to 3pm to the [facility] in [place].     The patient was instructed to call 911 as usual for any medical- emergencies while at home.  The patient was also given a phone number for troubleshooting when using the device in case there were additional questions.

## 2024-05-23 ENCOUNTER — HOSPITAL ENCOUNTER (OUTPATIENT)
Dept: RADIOLOGY | Facility: CLINIC | Age: 64
Discharge: HOME | End: 2024-05-23
Payer: MEDICARE

## 2024-05-23 DIAGNOSIS — M81.0 OSTEOPOROSIS: ICD-10-CM

## 2024-05-23 DIAGNOSIS — M19.90 OSTEOARTHRITIS, UNSPECIFIED OSTEOARTHRITIS TYPE, UNSPECIFIED SITE: ICD-10-CM

## 2024-05-23 PROCEDURE — 77080 DXA BONE DENSITY AXIAL: CPT

## 2024-05-23 PROCEDURE — 77080 DXA BONE DENSITY AXIAL: CPT | Performed by: RADIOLOGY

## 2024-06-11 ENCOUNTER — CLINICAL SUPPORT (OUTPATIENT)
Dept: PRIMARY CARE | Facility: CLINIC | Age: 64
End: 2024-06-11
Payer: MEDICARE

## 2024-06-11 DIAGNOSIS — I10 HYPERTENSION, UNSPECIFIED TYPE: ICD-10-CM

## 2024-06-11 DIAGNOSIS — R73.9 HYPERGLYCEMIA: ICD-10-CM

## 2024-06-11 DIAGNOSIS — E11.9 TYPE 2 DIABETES MELLITUS WITHOUT COMPLICATION, WITHOUT LONG-TERM CURRENT USE OF INSULIN (MULTI): ICD-10-CM

## 2024-06-11 LAB
EST. AVERAGE GLUCOSE BLD GHB EST-MCNC: 134 MG/DL
HBA1C MFR BLD: 6.3 %

## 2024-06-11 PROCEDURE — 83036 HEMOGLOBIN GLYCOSYLATED A1C: CPT

## 2024-06-11 PROCEDURE — 80048 BASIC METABOLIC PNL TOTAL CA: CPT

## 2024-06-11 PROCEDURE — 36415 COLL VENOUS BLD VENIPUNCTURE: CPT

## 2024-06-12 LAB
ANION GAP SERPL CALC-SCNC: 13 MMOL/L (ref 10–20)
BUN SERPL-MCNC: 20 MG/DL (ref 6–23)
CALCIUM SERPL-MCNC: 9.6 MG/DL (ref 8.6–10.6)
CHLORIDE SERPL-SCNC: 103 MMOL/L (ref 98–107)
CO2 SERPL-SCNC: 27 MMOL/L (ref 21–32)
CREAT SERPL-MCNC: 0.76 MG/DL (ref 0.5–1.05)
EGFRCR SERPLBLD CKD-EPI 2021: 87 ML/MIN/1.73M*2
GLUCOSE SERPL-MCNC: 108 MG/DL (ref 74–99)
POTASSIUM SERPL-SCNC: 4 MMOL/L (ref 3.5–5.3)
SODIUM SERPL-SCNC: 139 MMOL/L (ref 136–145)

## 2024-06-13 RX ORDER — EMPAGLIFLOZIN 25 MG/1
25 TABLET, FILM COATED ORAL DAILY
Qty: 90 TABLET | Refills: 2 | Status: SHIPPED | OUTPATIENT
Start: 2024-06-13

## 2024-06-14 ENCOUNTER — APPOINTMENT (OUTPATIENT)
Dept: PRIMARY CARE | Facility: CLINIC | Age: 64
End: 2024-06-14
Payer: MEDICARE

## 2024-06-14 VITALS
RESPIRATION RATE: 12 BRPM | SYSTOLIC BLOOD PRESSURE: 128 MMHG | OXYGEN SATURATION: 96 % | BODY MASS INDEX: 40.74 KG/M2 | HEART RATE: 77 BPM | DIASTOLIC BLOOD PRESSURE: 80 MMHG | WEIGHT: 230 LBS

## 2024-06-14 DIAGNOSIS — E11.9 TYPE 2 DIABETES MELLITUS WITHOUT COMPLICATION, WITHOUT LONG-TERM CURRENT USE OF INSULIN (MULTI): ICD-10-CM

## 2024-06-14 DIAGNOSIS — E78.5 HYPERLIPIDEMIA, UNSPECIFIED HYPERLIPIDEMIA TYPE: Primary | ICD-10-CM

## 2024-06-14 DIAGNOSIS — M77.9 TENDINITIS: ICD-10-CM

## 2024-06-14 DIAGNOSIS — I10 PRIMARY HYPERTENSION: ICD-10-CM

## 2024-06-14 DIAGNOSIS — K21.9 GASTROESOPHAGEAL REFLUX DISEASE WITHOUT ESOPHAGITIS: ICD-10-CM

## 2024-06-14 DIAGNOSIS — Z71.2 VISIT FOR REVIEW OF DEXA SCAN: ICD-10-CM

## 2024-06-14 DIAGNOSIS — G47.33 MILD OBSTRUCTIVE SLEEP APNEA: ICD-10-CM

## 2024-06-14 LAB — POC FINGERSTICK BLOOD GLUCOSE: 107 MG/DL (ref 70–100)

## 2024-06-14 RX ORDER — OMEPRAZOLE 40 MG/1
40 CAPSULE, DELAYED RELEASE ORAL
Qty: 30 CAPSULE | Refills: 11 | Status: SHIPPED | OUTPATIENT
Start: 2024-06-14

## 2024-06-14 RX ORDER — FLAXSEED OIL 1000 MG
1 CAPSULE ORAL DAILY
Qty: 90 CAPSULE | Refills: 3 | Status: SHIPPED | OUTPATIENT
Start: 2024-06-14

## 2024-06-14 RX ORDER — DICLOFENAC SODIUM 10 MG/G
4 GEL TOPICAL 4 TIMES DAILY
Qty: 100 G | Refills: 2 | Status: SHIPPED | OUTPATIENT
Start: 2024-06-14

## 2024-06-14 NOTE — PROGRESS NOTES
Subjective   Chief complaint: Cici Bains is a 63 y.o. female who presents for Follow-up (Pt is here for blood work follow up. Pt c/o pain in right thumb).    HPI:  HPI  Pt comes to the office to review and discuss lab results. Pt reports new wrist  pain on flexion.     Objective   /80   Pulse 77   Resp 12   Wt 104 kg (230 lb)   SpO2 96%   BMI 40.74 kg/m²   Physical Exam  Vitals reviewed.   Constitutional:       Appearance: Normal appearance.   HENT:      Head: Normocephalic.   Cardiovascular:      Rate and Rhythm: Normal rate and regular rhythm.      Pulses: Normal pulses.      Heart sounds: Normal heart sounds.   Pulmonary:      Effort: Pulmonary effort is normal.      Breath sounds: Normal breath sounds.   Abdominal:      Palpations: Abdomen is soft.   Neurological:      General: No focal deficit present.      Mental Status: She is alert and oriented to person, place, and time.   Psychiatric:         Mood and Affect: Mood normal.         Behavior: Behavior normal.         Thought Content: Thought content normal.         Judgment: Judgment normal.         I have reviewed and reconciled the medication list with the patient today.   Current Outpatient Medications:     atorvastatin (Lipitor) 40 mg tablet, Take 1 tablet (40 mg) by mouth once daily., Disp: 90 tablet, Rfl: 3    cholecalciferol (Vitamin D-3) 50 MCG (2000 UT) tablet, Take 1 tablet (50 mcg) by mouth once daily., Disp: 90 tablet, Rfl: 3    cyanocobalamin, vitamin B-12, 5,000 mcg tablet,disintegrating, Take 1 tablet by mouth once daily., Disp: , Rfl:     Jardiance 25 mg, TAKE 1 TABLET BY MOUTH EVERY DAY, Disp: 90 tablet, Rfl: 2    levothyroxine (Synthroid, Levoxyl) 75 mcg tablet, Take 1 tablet (75 mcg) by mouth once daily., Disp: 90 tablet, Rfl: 3    omeprazole (PriLOSEC) 20 mg DR capsule, Take 1 capsule (20 mg) by mouth 2 times a day., Disp: , Rfl:     potassium chloride CR (Klor-Con M10) 10 mEq ER tablet, Take 1 tablet (10 mEq) by mouth once  daily., Disp: 90 tablet, Rfl: 3    triamterene-hydrochlorothiazid (Maxzide-25) 37.5-25 mg tablet, Take 1 tablet by mouth once daily., Disp: 90 tablet, Rfl: 3     Imaging:  XR DEXA bone density    Result Date: 5/23/2024  Interpreted By:  Roxie Franklin, STUDY: DEXA BONE DENSITY5/23/2024 11:11 am   INDICATION: Signs/Symptoms:osteoarthtitis. The patient is a 62 y/o  year old F.   COMPARISON: None.   ACCESSION NUMBER(S): [id number]   ORDERING CLINICIAN: ELMA BLANK   TECHNIQUE: DEXA BONE DENSITY   FINDINGS: SPINE L1-L4 Bone Mineral Density: 1.099 T-Score 0.5  Z-Score 1.5     LEFT FEMUR -TOTAL Bone Mineral Density: 1.032 T-Score 0.7   Z-Score  1     LEFT FEMUR -NECK Bone Mineral Density: 0.822 T-Score -0.2  Z-Score 0.4       World Health Organization (WHO) criteria for post-menopausal,  Women: Normal:         T-score at or above -1 SD Osteopenia:   T-score between -1 and -2.5 SD Osteoporosis: T-score at or below -2.5 SD     10-year Fracture Risk: Major Osteoporotic Fracture  Not reported Hip Fracture                        Not reported   FRAX not reported because all T-scores at or above -1.0.     This exam was performed at [facility] on a [facility] Dexa Unit.       DEXA:  According to World Health Organization criteria, classification is normal.   Followup recommended in 2 years or sooner as clinically warranted.   All images and detailed analysis are available on the  Radiology PACS.   MACRO: None   Signed by: Roxie Franklin 5/23/2024 11:29 AM Dictation workstation:   [id number]       Labs reviewed:    Lab Results   Component Value Date    WBC 5.2 02/26/2024    HGB 12.6 02/26/2024    HCT 42.8 02/26/2024     02/26/2024    CHOL 209 (H) 02/26/2024    TRIG 91 02/26/2024    HDL 56.3 02/26/2024    ALT 25 02/26/2024    AST 20 02/26/2024     06/11/2024    K 4.0 06/11/2024     06/11/2024    CREATININE 0.76 06/11/2024    BUN 20 06/11/2024    CO2 27 06/11/2024    TSH 2.15  02/26/2024    INR 1.0 02/24/2020    HGBA1C 6.3 (H) 06/11/2024       Assessment/Plan   Problem List Items Addressed This Visit       GERD (gastroesophageal reflux disease)     Increase Omeprazole to 40 mg           HLD (hyperlipidemia) - Primary     Atorvastatin 40 mg every day.   Recommended Flaxseed/Fish oil  1200 mcg 3x a day w/every meal         HTN (hypertension)     Controlled. Continue with current regimen.          Mild obstructive sleep apnea     Advised good sleep hygiene  Lifestyle modification recommended  Weight loss recommended  Discuss wearing nasal strips         Type 2 diabetes mellitus (Multi)     Hemoglobin A1C today 6.3. Discussed lifestyle modifications including diet and exercise. Continue Jardiance 25 mg every day. Recheck A1C in 3 months.          Relevant Orders    POCT fingerstick glucose manually resulted (Completed)    Visit for review of DEXA scan     Results came back normal         Tendinitis     Voltaren gel             Continue current medications as listed  Follow up in 3mo

## 2024-06-14 NOTE — ASSESSMENT & PLAN NOTE
Hemoglobin A1C today 6.3. Discussed lifestyle modifications including diet and exercise. Continue Jardiance 25 mg every day. Recheck A1C in 3 months.

## 2024-06-14 NOTE — ASSESSMENT & PLAN NOTE
Advised good sleep hygiene  Lifestyle modification recommended  Weight loss recommended  Discuss wearing nasal strips

## 2024-09-09 ENCOUNTER — APPOINTMENT (OUTPATIENT)
Dept: PRIMARY CARE | Facility: CLINIC | Age: 64
End: 2024-09-09
Payer: MEDICARE

## 2024-09-13 ENCOUNTER — APPOINTMENT (OUTPATIENT)
Dept: PRIMARY CARE | Facility: CLINIC | Age: 64
End: 2024-09-13
Payer: MEDICARE

## 2024-09-20 ENCOUNTER — CLINICAL SUPPORT (OUTPATIENT)
Dept: PRIMARY CARE | Facility: CLINIC | Age: 64
End: 2024-09-20
Payer: MEDICARE

## 2024-09-20 DIAGNOSIS — I10 PRIMARY HYPERTENSION: ICD-10-CM

## 2024-09-20 DIAGNOSIS — E03.9 HYPOTHYROIDISM, UNSPECIFIED TYPE: ICD-10-CM

## 2024-09-20 DIAGNOSIS — I10 BENIGN ESSENTIAL HYPERTENSION: ICD-10-CM

## 2024-09-20 DIAGNOSIS — E11.9 TYPE 2 DIABETES MELLITUS WITHOUT COMPLICATION, WITHOUT LONG-TERM CURRENT USE OF INSULIN (MULTI): ICD-10-CM

## 2024-09-20 DIAGNOSIS — D50.9 IRON DEFICIENCY ANEMIA, UNSPECIFIED IRON DEFICIENCY ANEMIA TYPE: ICD-10-CM

## 2024-09-20 PROCEDURE — 83036 HEMOGLOBIN GLYCOSYLATED A1C: CPT

## 2024-09-20 PROCEDURE — 80053 COMPREHEN METABOLIC PANEL: CPT

## 2024-09-21 LAB
ALBUMIN SERPL BCP-MCNC: 4.1 G/DL (ref 3.4–5)
ALP SERPL-CCNC: 66 U/L (ref 33–136)
ALT SERPL W P-5'-P-CCNC: 17 U/L (ref 7–45)
ANION GAP SERPL CALC-SCNC: 17 MMOL/L (ref 10–20)
AST SERPL W P-5'-P-CCNC: 15 U/L (ref 9–39)
BILIRUB SERPL-MCNC: 0.6 MG/DL (ref 0–1.2)
BUN SERPL-MCNC: 18 MG/DL (ref 6–23)
CALCIUM SERPL-MCNC: 9.5 MG/DL (ref 8.6–10.6)
CHLORIDE SERPL-SCNC: 103 MMOL/L (ref 98–107)
CO2 SERPL-SCNC: 26 MMOL/L (ref 21–32)
CREAT SERPL-MCNC: 0.82 MG/DL (ref 0.5–1.05)
EGFRCR SERPLBLD CKD-EPI 2021: 79 ML/MIN/1.73M*2
EST. AVERAGE GLUCOSE BLD GHB EST-MCNC: 146 MG/DL
GLUCOSE SERPL-MCNC: 102 MG/DL (ref 74–99)
HBA1C MFR BLD: 6.7 %
POTASSIUM SERPL-SCNC: 3.9 MMOL/L (ref 3.5–5.3)
PROT SERPL-MCNC: 6.9 G/DL (ref 6.4–8.2)
SODIUM SERPL-SCNC: 142 MMOL/L (ref 136–145)

## 2024-09-23 ENCOUNTER — APPOINTMENT (OUTPATIENT)
Dept: PRIMARY CARE | Facility: CLINIC | Age: 64
End: 2024-09-23
Payer: MEDICARE

## 2024-09-23 VITALS
WEIGHT: 232 LBS | HEART RATE: 86 BPM | OXYGEN SATURATION: 95 % | RESPIRATION RATE: 12 BRPM | SYSTOLIC BLOOD PRESSURE: 122 MMHG | BODY MASS INDEX: 41.1 KG/M2 | DIASTOLIC BLOOD PRESSURE: 85 MMHG

## 2024-09-23 DIAGNOSIS — I10 PRIMARY HYPERTENSION: Primary | ICD-10-CM

## 2024-09-23 DIAGNOSIS — E11.9 TYPE 2 DIABETES MELLITUS WITHOUT COMPLICATION, WITHOUT LONG-TERM CURRENT USE OF INSULIN (MULTI): ICD-10-CM

## 2024-09-23 DIAGNOSIS — Z23 NEEDS FLU SHOT: ICD-10-CM

## 2024-09-23 LAB — POC FINGERSTICK BLOOD GLUCOSE: 100 MG/DL (ref 70–100)

## 2024-09-23 PROCEDURE — 1159F MED LIST DOCD IN RCRD: CPT | Performed by: INTERNAL MEDICINE

## 2024-09-23 PROCEDURE — 3074F SYST BP LT 130 MM HG: CPT | Performed by: INTERNAL MEDICINE

## 2024-09-23 PROCEDURE — 3044F HG A1C LEVEL LT 7.0%: CPT | Performed by: INTERNAL MEDICINE

## 2024-09-23 PROCEDURE — 3050F LDL-C >= 130 MG/DL: CPT | Performed by: INTERNAL MEDICINE

## 2024-09-23 PROCEDURE — G0008 ADMIN INFLUENZA VIRUS VAC: HCPCS | Performed by: INTERNAL MEDICINE

## 2024-09-23 PROCEDURE — 1036F TOBACCO NON-USER: CPT | Performed by: INTERNAL MEDICINE

## 2024-09-23 PROCEDURE — 99214 OFFICE O/P EST MOD 30 MIN: CPT | Performed by: INTERNAL MEDICINE

## 2024-09-23 PROCEDURE — 3079F DIAST BP 80-89 MM HG: CPT | Performed by: INTERNAL MEDICINE

## 2024-09-23 PROCEDURE — 90662 IIV NO PRSV INCREASED AG IM: CPT | Performed by: INTERNAL MEDICINE

## 2024-09-23 PROCEDURE — 82962 GLUCOSE BLOOD TEST: CPT | Performed by: INTERNAL MEDICINE

## 2024-09-23 NOTE — ASSESSMENT & PLAN NOTE
Controlled. /85 today.   Pt states that since last visit, she has been taking half a tablet of Maxide-25 due to episodes of dizziness and is tolerating it well. Denies syncopal episodes, chest pain, SOB, or dizziness.  Continue current regimen as discussed.

## 2024-09-23 NOTE — PROGRESS NOTES
Subjective   Chief complaint: Cici Bains is a 64 y.o. female who presents for Follow-up (Pt being seen for blood work follow up /).    HPI:  Patient is here for a follow-up regarding her recent blood work on 09/20/2024. She does not report any other concerns or complaints.         Objective   /85   Pulse 86   Resp 12   Wt 105 kg (232 lb)   SpO2 95%   BMI 41.10 kg/m²   Physical Exam  Vitals reviewed.   Constitutional:       Appearance: Normal appearance.   HENT:      Head: Normocephalic.      Right Ear: External ear normal.      Left Ear: External ear normal.   Eyes:      Extraocular Movements: Extraocular movements intact.   Cardiovascular:      Rate and Rhythm: Normal rate and regular rhythm.      Heart sounds: Normal heart sounds. No murmur heard.     No gallop.   Pulmonary:      Breath sounds: Normal breath sounds. No wheezing, rhonchi or rales.   Musculoskeletal:      Cervical back: Normal range of motion.      Right lower leg: No edema.      Left lower leg: No edema.   Skin:     General: Skin is dry.   Neurological:      General: No focal deficit present.      Mental Status: She is alert.   Psychiatric:         Mood and Affect: Mood normal.         Behavior: Behavior normal.         Thought Content: Thought content normal.         Judgment: Judgment normal.         I have reviewed and reconciled the medication list with the patient today.   Current Outpatient Medications:     atorvastatin (Lipitor) 40 mg tablet, Take 1 tablet (40 mg) by mouth once daily., Disp: 90 tablet, Rfl: 3    cholecalciferol (Vitamin D-3) 50 MCG (2000 UT) tablet, Take 1 tablet (50 mcg) by mouth once daily., Disp: 90 tablet, Rfl: 3    cyanocobalamin, vitamin B-12, 5,000 mcg tablet,disintegrating, Take 1 tablet by mouth once daily., Disp: , Rfl:     flaxseed oiL (Omega-3 flaxseed oiL) 1,000 mg capsule, Take 1 capsule (1,000 mg) by mouth once daily., Disp: 90 capsule, Rfl: 3    Jardiance 25 mg, TAKE 1 TABLET BY MOUTH EVERY DAY,  Disp: 90 tablet, Rfl: 2    levothyroxine (Synthroid, Levoxyl) 75 mcg tablet, Take 1 tablet (75 mcg) by mouth once daily., Disp: 90 tablet, Rfl: 3    omeprazole (PriLOSEC) 40 mg DR capsule, Take 1 capsule (40 mg) by mouth once daily in the morning. Take before meals. Do not crush or chew., Disp: 30 capsule, Rfl: 11    potassium chloride CR (Klor-Con M10) 10 mEq ER tablet, Take 1 tablet (10 mEq) by mouth once daily., Disp: 90 tablet, Rfl: 3    triamterene-hydrochlorothiazid (Maxzide-25) 37.5-25 mg tablet, Take 1 tablet by mouth once daily., Disp: 90 tablet, Rfl: 3    diclofenac sodium (Voltaren Arthritis Pain) 1 % gel, Apply 4.5 inches (4 g) topically 4 times a day., Disp: 100 g, Rfl: 2     Imaging:  No results found.     Labs reviewed:    Lab Results   Component Value Date    WBC 5.2 02/26/2024    HGB 12.6 02/26/2024    HCT 42.8 02/26/2024     02/26/2024    CHOL 209 (H) 02/26/2024    TRIG 91 02/26/2024    HDL 56.3 02/26/2024    ALT 17 09/20/2024    AST 15 09/20/2024     09/20/2024    K 3.9 09/20/2024     09/20/2024    CREATININE 0.82 09/20/2024    BUN 18 09/20/2024    CO2 26 09/20/2024    TSH 2.15 02/26/2024    INR 1.0 02/24/2020    HGBA1C 6.7 (H) 09/20/2024       Assessment/Plan   Problem List Items Addressed This Visit          Cardiac and Vasculature    HTN (hypertension) - Primary     Controlled. /85 today.   Pt states that since last visit, she has been taking half a tablet of Maxide-25 due to episodes of dizziness and is tolerating it well. Denies syncopal episodes, chest pain, SOB, or dizziness.  Continue current regimen as discussed.             Endocrine/Metabolic    Type 2 diabetes mellitus (Multi)     Hemoglobin A1C today 6.7 (previous A1C 6.3 on 06/2024).   Discussed lifestyle modifications including importance of maintaining a healthy diet and regular exercise. Pt states she will continue to use exercise bicycle three times per week. Discussed potential benefit of initiating a  GLP-1, but pt would like to wait until next follow-up in 3 months to consider after initiating lifestyle changes first.   Continue Jardiance 25 mg every day. Will recheck A1C and CMP in 3 months.         Relevant Orders    POCT fingerstick glucose manually resulted (Completed)       Continue current medications as listed  Follow up in 3 months with follow-up CMP and HBA1C.

## 2024-09-23 NOTE — ASSESSMENT & PLAN NOTE
Hemoglobin A1C today 6.7 (previous A1C 6.3 on 06/2024).   Discussed lifestyle modifications including importance of maintaining a healthy diet and regular exercise. Pt states she will continue to use exercise bicycle three times per week. Discussed potential benefit of initiating a GLP-1, but pt would like to wait until next follow-up in 3 months to consider after initiating lifestyle changes first.   Continue Jardiance 25 mg every day. Will recheck A1C and CMP in 3 months.

## 2024-10-10 ENCOUNTER — OFFICE VISIT (OUTPATIENT)
Dept: URGENT CARE | Age: 64
End: 2024-10-10
Payer: MEDICARE

## 2024-10-10 VITALS
SYSTOLIC BLOOD PRESSURE: 162 MMHG | RESPIRATION RATE: 18 BRPM | DIASTOLIC BLOOD PRESSURE: 93 MMHG | TEMPERATURE: 98 F | OXYGEN SATURATION: 98 % | HEART RATE: 71 BPM

## 2024-10-10 DIAGNOSIS — M25.561 ACUTE PAIN OF RIGHT KNEE: Primary | ICD-10-CM

## 2024-10-10 PROCEDURE — 3077F SYST BP >= 140 MM HG: CPT

## 2024-10-10 PROCEDURE — 3050F LDL-C >= 130 MG/DL: CPT

## 2024-10-10 PROCEDURE — 3044F HG A1C LEVEL LT 7.0%: CPT

## 2024-10-10 PROCEDURE — 99213 OFFICE O/P EST LOW 20 MIN: CPT

## 2024-10-10 PROCEDURE — 1159F MED LIST DOCD IN RCRD: CPT

## 2024-10-10 PROCEDURE — 3080F DIAST BP >= 90 MM HG: CPT

## 2024-10-10 PROCEDURE — 1036F TOBACCO NON-USER: CPT

## 2024-10-10 RX ORDER — NAPROXEN 500 MG/1
500 TABLET ORAL
Qty: 60 TABLET | Refills: 0 | Status: SHIPPED | OUTPATIENT
Start: 2024-10-10 | End: 2024-11-09

## 2024-10-10 ASSESSMENT — ENCOUNTER SYMPTOMS: ARTHRALGIAS: 1

## 2024-10-10 NOTE — PROGRESS NOTES
Subjective   Patient ID: Cici Bains is a 64 y.o. female. They present today with a chief complaint of Injury (RIGHT KNEE PAIN; HURTS TO FULLY STRAIGHTEN LEG OR WALK ON HEEL ).    History of Present Illness  Patient is a 64-year-old female with no relevant past medical history presents to urgent care this evening with a complaint of right knee pain.  She states she was working out with her senior citizens group doing band exercises and after the third day, she began having pain in her knee.  She denies any specific injury or insult.  She has been ambulating but with discomfort.  She denies any numbness, tingling or weakness.  No other complaints or concerns mention at this time.      History provided by:  Patient  Injury      Past Medical History  Allergies as of 10/10/2024 - Reviewed 10/10/2024   Allergen Reaction Noted    Ampicillin Unknown 10/30/2023    Lisinopril Unknown 07/02/2015    Lisinopril-hydrochlorothiazide Unknown 04/07/2016    Penicillins Unknown 04/19/2023       (Not in a hospital admission)         No past medical history on file.    Past Surgical History:   Procedure Laterality Date    OTHER SURGICAL HISTORY  01/29/2019    Hysterectomy    OTHER SURGICAL HISTORY  01/29/2019    Breast reduction        reports that she has never smoked. She has never used smokeless tobacco. She reports that she does not currently use alcohol. She reports that she does not use drugs.    Review of Systems  Review of Systems   Musculoskeletal:  Positive for arthralgias.                                  Objective    Vitals:    10/10/24 1927   BP: (!) 162/93   Pulse: 71   Resp: 18   Temp: 36.7 °C (98 °F)   SpO2: 98%     No LMP recorded. Patient is postmenopausal.    Physical Exam  Vitals and nursing note reviewed.   Constitutional:       General: She is not in acute distress.     Appearance: Normal appearance. She is not ill-appearing, toxic-appearing or diaphoretic.   HENT:      Head: Normocephalic and atraumatic.       Mouth/Throat:      Mouth: Mucous membranes are moist.   Eyes:      Extraocular Movements: Extraocular movements intact.      Conjunctiva/sclera: Conjunctivae normal.      Pupils: Pupils are equal, round, and reactive to light.   Cardiovascular:      Rate and Rhythm: Normal rate and regular rhythm.      Pulses: Normal pulses.      Heart sounds: Normal heart sounds.   Pulmonary:      Effort: Pulmonary effort is normal. No respiratory distress.      Breath sounds: Normal breath sounds. No stridor. No wheezing, rhonchi or rales.   Chest:      Chest wall: No tenderness.   Musculoskeletal:         General: Tenderness present. No swelling, deformity or signs of injury. Normal range of motion.      Cervical back: Normal range of motion and neck supple.      Right knee: No swelling, deformity, effusion, erythema, ecchymosis, lacerations, bony tenderness or crepitus. Normal range of motion. Tenderness present. No patellar tendon tenderness. No LCL laxity, MCL laxity, ACL laxity or PCL laxity. Normal alignment, normal meniscus and normal patellar mobility. Normal pulse.      Left knee: Normal.        Legs:       Comments: Generalized right knee discomfort.  No appreciable edema.  No ecchymosis.  No obvious deformity or dislocation.  Patient able to fully extend and flex the knee.  She is able to bear weight but with some discomfort.  She denies any calf tenderness or posterior knee pain.  No appreciable laxity.  Normal skin color and temperature.   Skin:     General: Skin is warm and dry.      Capillary Refill: Capillary refill takes less than 2 seconds.   Neurological:      General: No focal deficit present.      Mental Status: She is alert and oriented to person, place, and time.   Psychiatric:         Mood and Affect: Mood normal.         Behavior: Behavior normal.         Procedures      Assessment/Plan   Allergies, medications, history, and pertinent labs/EKGs/Imaging reviewed by DANIA Sanchez-JALEN.     Medical  Decision Making  Patient is well appearing, afebrile, non toxic, not hypoxic, and appropriate for outpatient treatment and management at time of evaluation. Patient presents with right knee pain x 1 week as described above. Differential includes but not limited to: Strain, sprain, osteoarthritis, other.  Low suspicion for fracture given no trauma therefore no imaging obtained.  Exam is relatively benign and consistent with sprain, overuse.  Recommended over-the-counter anti-inflammatories as well as rest and ice.  Patient requested a prescription for naproxen be called into her pharmacy.  An Ace wrap was applied for comfort.  Also recommended close follow-up with PCP.  Patient is agreeable this plan.  She was discharged in stable condition.  All questions and concerns addressed.      Plan: Discussed differential with the patient. Patient voices understanding and is agreeable to close follow-up with their PCP in the next 2-3 days. They understand they should go to the emergency room immediately for any new, worsening or concerning symptoms. Patient understands return precautions and discharge instructions.      Orders and Diagnoses  There are no diagnoses linked to this encounter.    Medical Admin Record      Follow Up Instructions  No follow-ups on file.    Patient disposition: Home    Electronically signed by DANIELLE Sanchez  7:45 PM

## 2024-10-10 NOTE — PATIENT INSTRUCTIONS
You were seen at Urgent Care today for right knee pain. Please treat as discussed. Please take medications as prescribed. Monitor for red flags which we spoke about, If your symptoms change, worsen or become concerning in any way, please go to the emergency room immediately, otherwise you can followup with your PCP in 2-3 days as needed

## 2024-10-21 ENCOUNTER — APPOINTMENT (OUTPATIENT)
Dept: PRIMARY CARE | Facility: CLINIC | Age: 64
End: 2024-10-21
Payer: MEDICARE

## 2024-10-21 VITALS
WEIGHT: 232 LBS | DIASTOLIC BLOOD PRESSURE: 66 MMHG | SYSTOLIC BLOOD PRESSURE: 95 MMHG | HEART RATE: 81 BPM | RESPIRATION RATE: 12 BRPM | OXYGEN SATURATION: 96 % | BODY MASS INDEX: 41.1 KG/M2

## 2024-10-21 DIAGNOSIS — M25.561 PAIN IN BOTH KNEES, UNSPECIFIED CHRONICITY: ICD-10-CM

## 2024-10-21 DIAGNOSIS — I10 PRIMARY HYPERTENSION: Primary | ICD-10-CM

## 2024-10-21 DIAGNOSIS — M17.0 PRIMARY OSTEOARTHRITIS OF BOTH KNEES: ICD-10-CM

## 2024-10-21 DIAGNOSIS — M62.838 MUSCLE SPASM: ICD-10-CM

## 2024-10-21 DIAGNOSIS — M25.562 PAIN IN BOTH KNEES, UNSPECIFIED CHRONICITY: ICD-10-CM

## 2024-10-21 PROCEDURE — 3078F DIAST BP <80 MM HG: CPT | Performed by: INTERNAL MEDICINE

## 2024-10-21 PROCEDURE — 3044F HG A1C LEVEL LT 7.0%: CPT | Performed by: INTERNAL MEDICINE

## 2024-10-21 PROCEDURE — 3074F SYST BP LT 130 MM HG: CPT | Performed by: INTERNAL MEDICINE

## 2024-10-21 PROCEDURE — 3050F LDL-C >= 130 MG/DL: CPT | Performed by: INTERNAL MEDICINE

## 2024-10-21 PROCEDURE — 99214 OFFICE O/P EST MOD 30 MIN: CPT | Performed by: INTERNAL MEDICINE

## 2024-10-21 NOTE — PROGRESS NOTES
Subjective   Chief complaint: Cici Bains is a 64 y.o. female who presents for Shoulder Pain and Knee Pain.    HPI:  For the last few weeks patient has been complaining of knee pain on both side the right worse than the left with the swelling the pain is under the patella on the right side the whole knee on the left side patient unable to climb stairs but she able to walk but the pain is so bad that we have to stop she was seen in urgent care she was given naproxen 500 twice a day.  Also she has been complaining of bilateral shoulder pain and shoulder pain goes down to the arm with the numbness and tingling in addition to the pain.    Shoulder Pain     Knee Pain         Objective   BP 95/66   Pulse 81   Resp 12   Wt 105 kg (232 lb)   SpO2 96%   BMI 41.10 kg/m²   Physical Exam  Vitals reviewed.   Constitutional:       Appearance: Normal appearance.   HENT:      Head: Normocephalic and atraumatic.   Cardiovascular:      Rate and Rhythm: Normal rate and regular rhythm.   Pulmonary:      Effort: Pulmonary effort is normal.      Breath sounds: Normal breath sounds.   Abdominal:      General: Bowel sounds are normal.      Palpations: Abdomen is soft.   Musculoskeletal:      Right shoulder: Tenderness present. Decreased range of motion.      Left shoulder: Tenderness present. Decreased range of motion.      Cervical back: Neck supple.      Right knee: Swelling and effusion present. Decreased range of motion. Tenderness present.      Left knee: Swelling present. Decreased range of motion. Tenderness present.   Skin:     General: Skin is warm and dry.   Neurological:      General: No focal deficit present.      Mental Status: She is alert.   Psychiatric:         Mood and Affect: Mood normal.         Behavior: Behavior is cooperative.         I have reviewed and reconciled the medication list with the patient today.   Current Outpatient Medications:     atorvastatin (Lipitor) 40 mg tablet, Take 1 tablet (40 mg) by  mouth once daily., Disp: 90 tablet, Rfl: 3    cholecalciferol (Vitamin D-3) 50 MCG (2000 UT) tablet, Take 1 tablet (50 mcg) by mouth once daily., Disp: 90 tablet, Rfl: 3    cyanocobalamin, vitamin B-12, 5,000 mcg tablet,disintegrating, Take 1 tablet by mouth once daily., Disp: , Rfl:     diclofenac sodium (Voltaren Arthritis Pain) 1 % gel, Apply 4.5 inches (4 g) topically 4 times a day., Disp: 100 g, Rfl: 2    flaxseed oiL (Omega-3 flaxseed oiL) 1,000 mg capsule, Take 1 capsule (1,000 mg) by mouth once daily., Disp: 90 capsule, Rfl: 3    Jardiance 25 mg, TAKE 1 TABLET BY MOUTH EVERY DAY, Disp: 90 tablet, Rfl: 2    levothyroxine (Synthroid, Levoxyl) 75 mcg tablet, Take 1 tablet (75 mcg) by mouth once daily., Disp: 90 tablet, Rfl: 3    naproxen (Naprosyn) 500 mg tablet, Take 1 tablet (500 mg) by mouth 2 times daily (morning and late afternoon)., Disp: 60 tablet, Rfl: 0    omeprazole (PriLOSEC) 40 mg DR capsule, Take 1 capsule (40 mg) by mouth once daily in the morning. Take before meals. Do not crush or chew., Disp: 30 capsule, Rfl: 11    potassium chloride CR (Klor-Con M10) 10 mEq ER tablet, Take 1 tablet (10 mEq) by mouth once daily., Disp: 90 tablet, Rfl: 3    triamterene-hydrochlorothiazid (Maxzide-25) 37.5-25 mg tablet, Take 1 tablet by mouth once daily., Disp: 90 tablet, Rfl: 3     Imaging:  No results found.     Labs reviewed:    Lab Results   Component Value Date    WBC 5.2 02/26/2024    HGB 12.6 02/26/2024    HCT 42.8 02/26/2024     02/26/2024    CHOL 209 (H) 02/26/2024    TRIG 91 02/26/2024    HDL 56.3 02/26/2024    ALT 17 09/20/2024    AST 15 09/20/2024     09/20/2024    K 3.9 09/20/2024     09/20/2024    CREATININE 0.82 09/20/2024    BUN 18 09/20/2024    CO2 26 09/20/2024    TSH 2.15 02/26/2024    INR 1.0 02/24/2020    HGBA1C 6.7 (H) 09/20/2024       Assessment/Plan   Problem List Items Addressed This Visit       HTN (hypertension) - Primary     Controlled. /85 today.   Pt states  that since last visit, she has been taking half a tablet of Maxide-25 due to episodes of dizziness and is tolerating it well. Denies syncopal episodes, chest pain, SOB, or dizziness.  Continue current regimen as discussed.          Muscle spasm     Muscle relaxer         Osteoarthritis     Naproxen follow-up in 4 weeks x-ray of both knees            Continue current medications as listed  Follow up in 4 weeks

## 2024-10-22 ENCOUNTER — HOSPITAL ENCOUNTER (OUTPATIENT)
Dept: RADIOLOGY | Facility: CLINIC | Age: 64
Discharge: HOME | End: 2024-10-22
Payer: MEDICARE

## 2024-10-22 DIAGNOSIS — M25.561 PAIN IN BOTH KNEES, UNSPECIFIED CHRONICITY: ICD-10-CM

## 2024-10-22 DIAGNOSIS — M25.562 PAIN IN BOTH KNEES, UNSPECIFIED CHRONICITY: ICD-10-CM

## 2024-10-22 PROCEDURE — 73560 X-RAY EXAM OF KNEE 1 OR 2: CPT | Mod: 50

## 2024-10-22 PROCEDURE — 73560 X-RAY EXAM OF KNEE 1 OR 2: CPT | Mod: BILATERAL PROCEDURE | Performed by: RADIOLOGY

## 2024-11-05 ENCOUNTER — APPOINTMENT (OUTPATIENT)
Dept: PRIMARY CARE | Facility: CLINIC | Age: 64
End: 2024-11-05
Payer: MEDICARE

## 2024-11-05 VITALS
BODY MASS INDEX: 42.51 KG/M2 | DIASTOLIC BLOOD PRESSURE: 86 MMHG | RESPIRATION RATE: 12 BRPM | HEART RATE: 80 BPM | WEIGHT: 240 LBS | OXYGEN SATURATION: 94 % | SYSTOLIC BLOOD PRESSURE: 156 MMHG

## 2024-11-05 DIAGNOSIS — M17.0 PRIMARY OSTEOARTHRITIS OF BOTH KNEES: Primary | ICD-10-CM

## 2024-11-05 DIAGNOSIS — K21.9 GASTROESOPHAGEAL REFLUX DISEASE WITHOUT ESOPHAGITIS: ICD-10-CM

## 2024-11-05 PROCEDURE — 3044F HG A1C LEVEL LT 7.0%: CPT | Performed by: INTERNAL MEDICINE

## 2024-11-05 PROCEDURE — 3077F SYST BP >= 140 MM HG: CPT | Performed by: INTERNAL MEDICINE

## 2024-11-05 PROCEDURE — 3050F LDL-C >= 130 MG/DL: CPT | Performed by: INTERNAL MEDICINE

## 2024-11-05 PROCEDURE — 1159F MED LIST DOCD IN RCRD: CPT | Performed by: INTERNAL MEDICINE

## 2024-11-05 PROCEDURE — 99214 OFFICE O/P EST MOD 30 MIN: CPT | Performed by: INTERNAL MEDICINE

## 2024-11-05 PROCEDURE — 3079F DIAST BP 80-89 MM HG: CPT | Performed by: INTERNAL MEDICINE

## 2024-11-05 PROCEDURE — 1036F TOBACCO NON-USER: CPT | Performed by: INTERNAL MEDICINE

## 2024-11-05 NOTE — ASSESSMENT & PLAN NOTE
Xray results:   No fracture or dislocation is evident.  No knee effusion is evident.  There is mild bilateral femorotibial and severe bilateral  patellofemoral degenerative change. No soft tissue gas or radiopaque  foreign body is evident.  Continue Naproxen  Encouraged pt to use Voltaren cream and aquatic therapy  Educated on at home knee exercises  Reinforced lifestyle changes and weight loss

## 2024-11-05 NOTE — ASSESSMENT & PLAN NOTE
Stable. Symptoms are well controlled per patient with current therapy. Denies odynophagia, dysphagia, hemoptysis, unintentional weight loss.     Reinforced lifestyle modifications including avoiding triggering foods such as spicy or acidic foods, avoiding meals within 2-3 hours of bedtime, and eating smaller, more frequent meals.

## 2024-11-05 NOTE — PROGRESS NOTES
Subjective   Chief complaint: Cici Bains is a 64 y.o. female who presents for Results ( patient being seen for results of knee x-ray).    HPI:  65yo presents for follow up for Xray results of bilateral knees.  No other acute complaints.  She continues to take Naproxen with mild improvement    Xray results:   No fracture or dislocation is evident.  No knee effusion is evident.  There is mild bilateral femorotibial and severe bilateral  patellofemoral degenerative change. No soft tissue gas or radiopaque  foreign body is evident.    Objective   /86   Pulse 80   Resp 12   Wt 109 kg (240 lb)   SpO2 94%   BMI 42.51 kg/m²   Physical Exam  Constitutional:       Appearance: Normal appearance.   HENT:      Head: Normocephalic and atraumatic.   Cardiovascular:      Rate and Rhythm: Normal rate and regular rhythm.      Heart sounds: Normal heart sounds.   Pulmonary:      Effort: Pulmonary effort is normal.      Breath sounds: Normal breath sounds.   Musculoskeletal:      Cervical back: Normal range of motion and neck supple.   Skin:     General: Skin is warm and dry.   Neurological:      General: No focal deficit present.      Mental Status: She is alert and oriented to person, place, and time.   Psychiatric:         Mood and Affect: Mood normal.         Behavior: Behavior normal.         Thought Content: Thought content normal.         Judgment: Judgment normal.         I have reviewed and reconciled the medication list with the patient today.   Current Outpatient Medications:     atorvastatin (Lipitor) 40 mg tablet, Take 1 tablet (40 mg) by mouth once daily., Disp: 90 tablet, Rfl: 3    cholecalciferol (Vitamin D-3) 50 MCG (2000 UT) tablet, Take 1 tablet (50 mcg) by mouth once daily., Disp: 90 tablet, Rfl: 3    cyanocobalamin, vitamin B-12, 5,000 mcg tablet,disintegrating, Take 1 tablet by mouth once daily., Disp: , Rfl:     diclofenac sodium (Voltaren Arthritis Pain) 1 % gel, Apply 4.5 inches (4 g) topically 4  times a day., Disp: 100 g, Rfl: 2    flaxseed oiL (Omega-3 flaxseed oiL) 1,000 mg capsule, Take 1 capsule (1,000 mg) by mouth once daily., Disp: 90 capsule, Rfl: 3    Jardiance 25 mg, TAKE 1 TABLET BY MOUTH EVERY DAY, Disp: 90 tablet, Rfl: 2    levothyroxine (Synthroid, Levoxyl) 75 mcg tablet, Take 1 tablet (75 mcg) by mouth once daily., Disp: 90 tablet, Rfl: 3    naproxen (Naprosyn) 500 mg tablet, Take 1 tablet (500 mg) by mouth 2 times daily (morning and late afternoon)., Disp: 60 tablet, Rfl: 0    omeprazole (PriLOSEC) 40 mg DR capsule, Take 1 capsule (40 mg) by mouth once daily in the morning. Take before meals. Do not crush or chew., Disp: 30 capsule, Rfl: 11    potassium chloride CR (Klor-Con M10) 10 mEq ER tablet, Take 1 tablet (10 mEq) by mouth once daily., Disp: 90 tablet, Rfl: 3    triamterene-hydrochlorothiazid (Maxzide-25) 37.5-25 mg tablet, Take 1 tablet by mouth once daily., Disp: 90 tablet, Rfl: 3     Imaging:  XR knee 1-2 views bilateral    Result Date: 10/25/2024  Interpreted By:  Alvaro Plascencia, STUDY: Bilateral knee dated  10/22/2024.   INDICATION: Signs/Symptoms:bilateral knee pain   COMPARISON: Radiographs dated 11/28/2014.   ACCESSION NUMBER(S): [id number]   ORDERING CLINICIAN: ELMA BLANK   TECHNIQUE: Two views of the bilateral knee.   FINDINGS: No fracture or dislocation is evident.  No knee effusion is evident. There is mild bilateral femorotibial and severe bilateral patellofemoral degenerative change. No soft tissue gas or radiopaque foreign body is evident.       Degenerative changes without osseous injury evident.   MACRO: None   Signed by: Alvaro Plascencia 10/25/2024 9:18 AM Dictation workstation:   [id number]       Labs reviewed:    Lab Results   Component Value Date    WBC 5.2 02/26/2024    HGB 12.6 02/26/2024    HCT 42.8 02/26/2024     02/26/2024    CHOL 209 (H) 02/26/2024    TRIG 91 02/26/2024    HDL 56.3 02/26/2024    ALT 17 09/20/2024    AST 15 09/20/2024      09/20/2024    K 3.9 09/20/2024     09/20/2024    CREATININE 0.82 09/20/2024    BUN 18 09/20/2024    CO2 26 09/20/2024    TSH 2.15 02/26/2024    INR 1.0 02/24/2020    HGBA1C 6.7 (H) 09/20/2024       Assessment/Plan   Problem List Items Addressed This Visit       GERD (gastroesophageal reflux disease)     Stable. Symptoms are well controlled per patient with current therapy. Denies odynophagia, dysphagia, hemoptysis, unintentional weight loss.     Reinforced lifestyle modifications including avoiding triggering foods such as spicy or acidic foods, avoiding meals within 2-3 hours of bedtime, and eating smaller, more frequent meals.           Osteoarthritis - Primary     Xray results:   No fracture or dislocation is evident.  No knee effusion is evident.  There is mild bilateral femorotibial and severe bilateral  patellofemoral degenerative change. No soft tissue gas or radiopaque  foreign body is evident.  Continue Naproxen  Encouraged pt to use Voltaren cream and aquatic therapy  Educated on at home knee exercises  Reinforced lifestyle changes and weight loss            Continue current medications as listed  Follow up in December for ROXIE

## 2024-12-13 DIAGNOSIS — E87.6 HYPOKALEMIA: ICD-10-CM

## 2024-12-13 RX ORDER — POTASSIUM CHLORIDE 750 MG/1
10 TABLET, EXTENDED RELEASE ORAL DAILY
Qty: 90 TABLET | Refills: 2 | Status: SHIPPED | OUTPATIENT
Start: 2024-12-13

## 2024-12-17 ENCOUNTER — LAB (OUTPATIENT)
Dept: LAB | Facility: LAB | Age: 64
End: 2024-12-17
Payer: MEDICARE

## 2024-12-17 ENCOUNTER — APPOINTMENT (OUTPATIENT)
Dept: PRIMARY CARE | Facility: CLINIC | Age: 64
End: 2024-12-17
Payer: MEDICARE

## 2024-12-17 DIAGNOSIS — I10 PRIMARY HYPERTENSION: ICD-10-CM

## 2024-12-17 DIAGNOSIS — E11.9 TYPE 2 DIABETES MELLITUS WITHOUT COMPLICATION, WITHOUT LONG-TERM CURRENT USE OF INSULIN (MULTI): ICD-10-CM

## 2024-12-17 PROCEDURE — 80053 COMPREHEN METABOLIC PANEL: CPT

## 2024-12-17 PROCEDURE — 36415 COLL VENOUS BLD VENIPUNCTURE: CPT

## 2024-12-17 PROCEDURE — 83036 HEMOGLOBIN GLYCOSYLATED A1C: CPT

## 2024-12-18 LAB
ALBUMIN SERPL BCP-MCNC: 4 G/DL (ref 3.4–5)
ALP SERPL-CCNC: 59 U/L (ref 33–136)
ALT SERPL W P-5'-P-CCNC: 14 U/L (ref 7–45)
ANION GAP SERPL CALC-SCNC: 11 MMOL/L (ref 10–20)
AST SERPL W P-5'-P-CCNC: 17 U/L (ref 9–39)
BILIRUB SERPL-MCNC: 0.7 MG/DL (ref 0–1.2)
BUN SERPL-MCNC: 17 MG/DL (ref 6–23)
CALCIUM SERPL-MCNC: 9.2 MG/DL (ref 8.6–10.6)
CHLORIDE SERPL-SCNC: 102 MMOL/L (ref 98–107)
CO2 SERPL-SCNC: 30 MMOL/L (ref 21–32)
CREAT SERPL-MCNC: 0.76 MG/DL (ref 0.5–1.05)
EGFRCR SERPLBLD CKD-EPI 2021: 87 ML/MIN/1.73M*2
EST. AVERAGE GLUCOSE BLD GHB EST-MCNC: 134 MG/DL
GLUCOSE SERPL-MCNC: 94 MG/DL (ref 74–99)
HBA1C MFR BLD: 6.3 %
POTASSIUM SERPL-SCNC: 3.7 MMOL/L (ref 3.5–5.3)
PROT SERPL-MCNC: 6.9 G/DL (ref 6.4–8.2)
SODIUM SERPL-SCNC: 139 MMOL/L (ref 136–145)

## 2024-12-19 ENCOUNTER — APPOINTMENT (OUTPATIENT)
Dept: PRIMARY CARE | Facility: CLINIC | Age: 64
End: 2024-12-19
Payer: MEDICARE

## 2024-12-19 ENCOUNTER — TELEPHONE (OUTPATIENT)
Dept: PRIMARY CARE | Facility: CLINIC | Age: 64
End: 2024-12-19

## 2024-12-19 VITALS
DIASTOLIC BLOOD PRESSURE: 83 MMHG | OXYGEN SATURATION: 94 % | SYSTOLIC BLOOD PRESSURE: 139 MMHG | RESPIRATION RATE: 12 BRPM | HEART RATE: 81 BPM

## 2024-12-19 DIAGNOSIS — E55.9 VITAMIN D DEFICIENCY: ICD-10-CM

## 2024-12-19 DIAGNOSIS — E78.5 HYPERLIPIDEMIA, UNSPECIFIED HYPERLIPIDEMIA TYPE: Primary | ICD-10-CM

## 2024-12-19 DIAGNOSIS — R73.9 HYPERGLYCEMIA: Primary | ICD-10-CM

## 2024-12-19 DIAGNOSIS — I10 HTN (HYPERTENSION): ICD-10-CM

## 2024-12-19 DIAGNOSIS — R32 URINARY INCONTINENCE: ICD-10-CM

## 2024-12-19 DIAGNOSIS — I10 PRIMARY HYPERTENSION: ICD-10-CM

## 2024-12-19 DIAGNOSIS — R73.9 HYPERGLYCEMIA: ICD-10-CM

## 2024-12-19 DIAGNOSIS — E11.9 TYPE 2 DIABETES MELLITUS WITHOUT COMPLICATION, WITHOUT LONG-TERM CURRENT USE OF INSULIN (MULTI): ICD-10-CM

## 2024-12-19 LAB — POC FINGERSTICK BLOOD GLUCOSE: 96 MG/DL (ref 70–100)

## 2024-12-19 PROCEDURE — 99214 OFFICE O/P EST MOD 30 MIN: CPT | Performed by: INTERNAL MEDICINE

## 2024-12-19 PROCEDURE — 3050F LDL-C >= 130 MG/DL: CPT | Performed by: INTERNAL MEDICINE

## 2024-12-19 PROCEDURE — 3044F HG A1C LEVEL LT 7.0%: CPT | Performed by: INTERNAL MEDICINE

## 2024-12-19 PROCEDURE — 3079F DIAST BP 80-89 MM HG: CPT | Performed by: INTERNAL MEDICINE

## 2024-12-19 PROCEDURE — 3075F SYST BP GE 130 - 139MM HG: CPT | Performed by: INTERNAL MEDICINE

## 2024-12-19 PROCEDURE — 82962 GLUCOSE BLOOD TEST: CPT | Performed by: INTERNAL MEDICINE

## 2024-12-19 NOTE — PROGRESS NOTES
Subjective   Chief complaint: Cici Bains is a 64 y.o. female who presents for Follow-up.    HPI:  Patient is here for a blood work follow up. Patient says she is doing well. Her last annual visit was 03/2024    She reports no further problems at this time.        Objective   /83   Pulse 81   Resp 12   SpO2 94%   Physical Exam  Constitutional:       Appearance: Normal appearance.   HENT:      Head: Normocephalic and atraumatic.      Mouth/Throat:      Mouth: Mucous membranes are moist.      Pharynx: Oropharynx is clear.   Eyes:      Extraocular Movements: Extraocular movements intact.      Pupils: Pupils are equal, round, and reactive to light.   Cardiovascular:      Rate and Rhythm: Normal rate and regular rhythm.   Pulmonary:      Effort: Pulmonary effort is normal.      Breath sounds: Normal breath sounds.   Abdominal:      General: Abdomen is flat.      Palpations: Abdomen is soft.   Musculoskeletal:         General: Normal range of motion.      Cervical back: Normal range of motion and neck supple.   Skin:     General: Skin is warm and dry.      Capillary Refill: Capillary refill takes less than 2 seconds.   Neurological:      General: No focal deficit present.      Mental Status: She is alert and oriented to person, place, and time.         I have reviewed and reconciled the medication list with the patient today.   Current Outpatient Medications:     atorvastatin (Lipitor) 40 mg tablet, Take 1 tablet (40 mg) by mouth once daily., Disp: 90 tablet, Rfl: 3    cholecalciferol (Vitamin D-3) 50 MCG (2000 UT) tablet, Take 1 tablet (50 mcg) by mouth once daily., Disp: 90 tablet, Rfl: 3    cyanocobalamin, vitamin B-12, 5,000 mcg tablet,disintegrating, Take 1 tablet by mouth once daily., Disp: , Rfl:     diclofenac sodium (Voltaren Arthritis Pain) 1 % gel, Apply 4.5 inches (4 g) topically 4 times a day. (Patient not taking: Reported on 12/19/2024), Disp: 100 g, Rfl: 2    flaxseed oiL (Omega-3 flaxseed oiL)  1,000 mg capsule, Take 1 capsule (1,000 mg) by mouth once daily., Disp: 90 capsule, Rfl: 3    Jardiance 25 mg, TAKE 1 TABLET BY MOUTH EVERY DAY, Disp: 90 tablet, Rfl: 2    levothyroxine (Synthroid, Levoxyl) 75 mcg tablet, Take 1 tablet (75 mcg) by mouth once daily., Disp: 90 tablet, Rfl: 3    omeprazole (PriLOSEC) 40 mg DR capsule, Take 1 capsule (40 mg) by mouth once daily in the morning. Take before meals. Do not crush or chew., Disp: 30 capsule, Rfl: 11    potassium chloride CR 10 mEq ER tablet, TAKE 1 TABLET BY MOUTH ONCE DAILY., Disp: 90 tablet, Rfl: 2    triamterene-hydrochlorothiazid (Maxzide-25) 37.5-25 mg tablet, Take 1 tablet by mouth once daily., Disp: 90 tablet, Rfl: 3     Imaging:  No results found.     Labs reviewed:    Lab Results   Component Value Date    WBC 5.2 02/26/2024    HGB 12.6 02/26/2024    HCT 42.8 02/26/2024     02/26/2024    CHOL 209 (H) 02/26/2024    TRIG 91 02/26/2024    HDL 56.3 02/26/2024    ALT 14 12/17/2024    AST 17 12/17/2024     12/17/2024    K 3.7 12/17/2024     12/17/2024    CREATININE 0.76 12/17/2024    BUN 17 12/17/2024    CO2 30 12/17/2024    TSH 2.15 02/26/2024    INR 1.0 02/24/2020    HGBA1C 6.3 (H) 12/17/2024       Assessment/Plan   Problem List Items Addressed This Visit       HLD (hyperlipidemia) - Primary     Atorvastatin 40 mg every day.   Recommended Flaxseed/Fish oil  1200 mcg 3x a day w/every meal         HTN (hypertension)     Controlled. /83 today.     Pt previously stated that she is taking half a tablet of Maxide-25 due to episodes of dizziness. She is tolerating it well. Denies syncopal episodes, chest pain, SOB, or dizziness.  Continue to take a half tablet of maxide-25         Hyperglycemia     A1c 6.3  Discussed lifestyle modifications including importance of maintaining a healthy diet and regular exercise. She states she will continue to use exercise.  Continue Jardiance 25 mg every day. Will recheck A1C and CMP in 3 months at  annual         Type 2 diabetes mellitus    Relevant Orders    POCT fingerstick glucose manually resulted (Completed)       Continue current medications as listed  Follow up in 3 months for an annual wellness visit

## 2024-12-19 NOTE — ASSESSMENT & PLAN NOTE
A1c 6.3  Discussed lifestyle modifications including importance of maintaining a healthy diet and regular exercise. She states she will continue to use exercise.  Continue Jardiance 25 mg every day. Will recheck A1C and CMP in 3 months at annual

## 2024-12-19 NOTE — ASSESSMENT & PLAN NOTE
Controlled. /83 today.     Pt previously stated that she is taking half a tablet of Maxide-25 due to episodes of dizziness. She is tolerating it well. Denies syncopal episodes, chest pain, SOB, or dizziness.  Continue to take a half tablet of maxide-25

## 2025-02-09 ENCOUNTER — APPOINTMENT (OUTPATIENT)
Dept: URGENT CARE | Age: 65
End: 2025-02-09
Payer: MEDICARE

## 2025-03-20 LAB
25(OH)D3+25(OH)D2 SERPL-MCNC: 25 NG/ML (ref 30–100)
ALBUMIN SERPL-MCNC: 4.1 G/DL (ref 3.6–5.1)
ALP SERPL-CCNC: 61 U/L (ref 37–153)
ALT SERPL-CCNC: 16 U/L (ref 6–29)
ANION GAP SERPL CALCULATED.4IONS-SCNC: 6 MMOL/L (CALC) (ref 7–17)
AST SERPL-CCNC: 14 U/L (ref 10–35)
BILIRUB SERPL-MCNC: 1 MG/DL (ref 0.2–1.2)
BUN SERPL-MCNC: 17 MG/DL (ref 7–25)
CALCIUM SERPL-MCNC: 9.7 MG/DL (ref 8.6–10.4)
CHLORIDE SERPL-SCNC: 105 MMOL/L (ref 98–110)
CHOLEST SERPL-MCNC: 164 MG/DL
CHOLEST/HDLC SERPL: 2.8 (CALC)
CO2 SERPL-SCNC: 31 MMOL/L (ref 20–32)
CREAT SERPL-MCNC: 0.82 MG/DL (ref 0.5–1.05)
EGFRCR SERPLBLD CKD-EPI 2021: 79 ML/MIN/1.73M2
ERYTHROCYTE [DISTWIDTH] IN BLOOD BY AUTOMATED COUNT: 13 % (ref 11–15)
EST. AVERAGE GLUCOSE BLD GHB EST-MCNC: 143 MG/DL
EST. AVERAGE GLUCOSE BLD GHB EST-SCNC: 7.9 MMOL/L
GLUCOSE SERPL-MCNC: 111 MG/DL (ref 65–99)
HBA1C MFR BLD: 6.6 % OF TOTAL HGB
HCT VFR BLD AUTO: 42.4 % (ref 35–45)
HDLC SERPL-MCNC: 58 MG/DL
HGB BLD-MCNC: 13.4 G/DL (ref 11.7–15.5)
LDLC SERPL CALC-MCNC: 91 MG/DL (CALC)
MCH RBC QN AUTO: 30.2 PG (ref 27–33)
MCHC RBC AUTO-ENTMCNC: 31.6 G/DL (ref 32–36)
MCV RBC AUTO: 95.5 FL (ref 80–100)
NONHDLC SERPL-MCNC: 106 MG/DL (CALC)
PLATELET # BLD AUTO: 209 THOUSAND/UL (ref 140–400)
PMV BLD REES-ECKER: 12.2 FL (ref 7.5–12.5)
POTASSIUM SERPL-SCNC: 4.3 MMOL/L (ref 3.5–5.3)
PROT SERPL-MCNC: 6.8 G/DL (ref 6.1–8.1)
RBC # BLD AUTO: 4.44 MILLION/UL (ref 3.8–5.1)
SODIUM SERPL-SCNC: 142 MMOL/L (ref 135–146)
TRIGL SERPL-MCNC: 64 MG/DL
TSH SERPL-ACNC: 1.26 MIU/L (ref 0.4–4.5)
WBC # BLD AUTO: 5 THOUSAND/UL (ref 3.8–10.8)